# Patient Record
Sex: FEMALE | NOT HISPANIC OR LATINO | Employment: UNEMPLOYED
[De-identification: names, ages, dates, MRNs, and addresses within clinical notes are randomized per-mention and may not be internally consistent; named-entity substitution may affect disease eponyms.]

---

## 2017-03-05 ENCOUNTER — HOSPITAL (OUTPATIENT)
Dept: OTHER | Age: 27
End: 2017-03-05
Attending: EMERGENCY MEDICINE

## 2017-03-05 LAB — ETHANOL SERPL-MCNC: 95 MG/DL

## 2018-08-08 ENCOUNTER — OCC HEALTH (OUTPATIENT)
Dept: OCCUPATIONAL MEDICINE | Age: 28
End: 2018-08-08
Attending: PHYSICIAN ASSISTANT

## 2018-08-15 ENCOUNTER — OFFICE VISIT (OUTPATIENT)
Dept: OCCUPATIONAL MEDICINE | Age: 28
End: 2018-08-15
Attending: PHYSICIAN ASSISTANT

## 2021-01-17 ENCOUNTER — HOSPITAL ENCOUNTER (EMERGENCY)
Facility: HOSPITAL | Age: 31
Discharge: HOME | End: 2021-01-17
Attending: STUDENT IN AN ORGANIZED HEALTH CARE EDUCATION/TRAINING PROGRAM
Payer: COMMERCIAL

## 2021-01-17 VITALS
HEART RATE: 66 BPM | BODY MASS INDEX: 25.61 KG/M2 | HEIGHT: 64 IN | SYSTOLIC BLOOD PRESSURE: 119 MMHG | DIASTOLIC BLOOD PRESSURE: 95 MMHG | WEIGHT: 150 LBS | TEMPERATURE: 98 F | OXYGEN SATURATION: 97 % | RESPIRATION RATE: 18 BRPM

## 2021-01-17 DIAGNOSIS — R50.9 FEVER, UNSPECIFIED FEVER CAUSE: ICD-10-CM

## 2021-01-17 DIAGNOSIS — R21 RASH: Primary | ICD-10-CM

## 2021-01-17 DIAGNOSIS — M25.50 ARTHRALGIA, UNSPECIFIED JOINT: ICD-10-CM

## 2021-01-17 LAB — SARS-COV-2 RNA RESP QL NAA+PROBE: NOT DETECTED

## 2021-01-17 PROCEDURE — 99283 EMERGENCY DEPT VISIT LOW MDM: CPT

## 2021-01-17 RX ORDER — PREDNISONE 20 MG/1
60 TABLET ORAL ONCE
Status: COMPLETED | OUTPATIENT
Start: 2021-01-17 | End: 2021-01-17

## 2021-01-17 RX ORDER — SERTRALINE HYDROCHLORIDE 25 MG/1
25 TABLET, FILM COATED ORAL DAILY
COMMUNITY
End: 2021-07-08

## 2021-01-17 RX ORDER — HYDROXYZINE HYDROCHLORIDE 25 MG/1
25 TABLET, FILM COATED ORAL 3 TIMES DAILY
COMMUNITY
End: 2021-07-08

## 2021-01-17 RX ORDER — LEVOTHYROXINE SODIUM 0.03 MG/1
25 TABLET ORAL
COMMUNITY
End: 2021-07-08

## 2021-01-17 RX ORDER — FAMOTIDINE 20 MG/1
20 TABLET ORAL 2 TIMES DAILY
Qty: 10 TABLET | Refills: 0 | Status: SHIPPED | OUTPATIENT
Start: 2021-01-17 | End: 2021-01-22

## 2021-01-17 RX ORDER — PREDNISONE 20 MG/1
60 TABLET ORAL DAILY
Qty: 15 TABLET | Refills: 0 | Status: SHIPPED | OUTPATIENT
Start: 2021-01-17 | End: 2021-01-22

## 2021-01-17 NOTE — ED PROVIDER NOTES
Patient Seen in: [facility] Emergency Department      History   Patient presents with:  Rash Skin Problem    Stated Complaint: pt states she has a rash all over her body for 2 days    HPI/Subjective:   HPI    Patient is a 24-year-old female who pres pulses. Exam reveals no gallop and no friction rub. No murmur heard. Pulmonary/Chest: Effort normal. No respiratory distress. No stridor, no wheezes. no rales. no tenderness. Abdominal: Soft. Bowel sounds are normal, no distension and no mass.  Ther [id number] [facility]  Schedule an appointment as soon as possible for a visit            Medications Prescribed:  Current Discharge Medication List    START taking these medications    predniSONE 20 MG Oral Tab  Take 3 tablets (6

## 2021-01-17 NOTE — ED INITIAL ASSESSMENT (HPI)
Pt c/o hives to arms and legs for 2 days, \"and my joints hurt. \" Pt denies LISANDRA. States taking Benadryl with no relief.  Pt also taking Hydroxyzine

## 2021-07-07 ENCOUNTER — HOSPITAL ENCOUNTER (EMERGENCY)
Facility: HOSPITAL | Age: 31
Discharge: NURSING, REHAB OR LONG-TERM CARE | End: 2021-07-08
Attending: EMERGENCY MEDICINE
Payer: COMMERCIAL

## 2021-07-07 DIAGNOSIS — F32.A DEPRESSION, UNSPECIFIED DEPRESSION TYPE: Primary | ICD-10-CM

## 2021-07-07 LAB
ALBUMIN SERPL-MCNC: 4.3 G/DL (ref 3.4–5)
ALBUMIN/GLOB SERPL: 1 {RATIO} (ref 1–2)
ALP LIVER SERPL-CCNC: 66 U/L
ALT SERPL-CCNC: 27 U/L
ANION GAP SERPL CALC-SCNC: 10 MMOL/L (ref 0–18)
APAP SERPL-MCNC: <2 UG/ML (ref 10–30)
AST SERPL-CCNC: 19 U/L (ref 15–37)
BASOPHILS # BLD AUTO: 0.01 X10(3) UL (ref 0–0.2)
BASOPHILS NFR BLD AUTO: 0.1 %
BILIRUB SERPL-MCNC: 0.4 MG/DL (ref 0.1–2)
BUN BLD-MCNC: 6 MG/DL (ref 7–18)
BUN/CREAT SERPL: 7.8 (ref 10–20)
CALCIUM BLD-MCNC: 9.5 MG/DL (ref 8.5–10.1)
CHLORIDE SERPL-SCNC: 103 MMOL/L (ref 98–112)
CO2 SERPL-SCNC: 23 MMOL/L (ref 21–32)
CREAT BLD-MCNC: 0.77 MG/DL
DEPRECATED RDW RBC AUTO: 39.1 FL (ref 35.1–46.3)
EOSINOPHIL # BLD AUTO: 0.01 X10(3) UL (ref 0–0.7)
EOSINOPHIL NFR BLD AUTO: 0.1 %
ERYTHROCYTE [DISTWIDTH] IN BLOOD BY AUTOMATED COUNT: 13.1 % (ref 11–15)
ETHANOL SERPL-MCNC: <3 MG/DL (ref ?–3)
FLUAV + FLUBV RNA SPEC NAA+PROBE: NEGATIVE
FLUAV + FLUBV RNA SPEC NAA+PROBE: NEGATIVE
GLOBULIN PLAS-MCNC: 4.5 G/DL (ref 2.8–4.4)
GLUCOSE BLD-MCNC: 113 MG/DL (ref 70–99)
HCT VFR BLD AUTO: 46.2 %
HGB BLD-MCNC: 15.1 G/DL
IMM GRANULOCYTES # BLD AUTO: 0.02 X10(3) UL (ref 0–1)
IMM GRANULOCYTES NFR BLD: 0.2 %
LYMPHOCYTES # BLD AUTO: 0.8 X10(3) UL (ref 1–4)
LYMPHOCYTES NFR BLD AUTO: 9.5 %
M PROTEIN MFR SERPL ELPH: 8.8 G/DL (ref 6.4–8.2)
MCH RBC QN AUTO: 26.9 PG (ref 26–34)
MCHC RBC AUTO-ENTMCNC: 32.7 G/DL (ref 31–37)
MCV RBC AUTO: 82.4 FL
MONOCYTES # BLD AUTO: 0.42 X10(3) UL (ref 0.1–1)
MONOCYTES NFR BLD AUTO: 5 %
NEUTROPHILS # BLD AUTO: 7.19 X10 (3) UL (ref 1.5–7.7)
NEUTROPHILS # BLD AUTO: 7.19 X10(3) UL (ref 1.5–7.7)
NEUTROPHILS NFR BLD AUTO: 85.1 %
OSMOLALITY SERPL CALC.SUM OF ELEC: 280 MOSM/KG (ref 275–295)
PLATELET # BLD AUTO: 229 10(3)UL (ref 150–450)
POTASSIUM SERPL-SCNC: 3.4 MMOL/L (ref 3.5–5.1)
RBC # BLD AUTO: 5.61 X10(6)UL
RSV RNA SPEC NAA+PROBE: NEGATIVE
SALICYLATES SERPL-MCNC: <1.7 MG/DL (ref 2.8–20)
SARS-COV-2 RNA RESP QL NAA+PROBE: NOT DETECTED
SODIUM SERPL-SCNC: 136 MMOL/L (ref 136–145)
TROPONIN I SERPL-MCNC: <0.045 NG/ML (ref ?–0.04)
WBC # BLD AUTO: 8.5 X10(3) UL (ref 4–11)

## 2021-07-07 PROCEDURE — 99285 EMERGENCY DEPT VISIT HI MDM: CPT

## 2021-07-07 PROCEDURE — 93010 ELECTROCARDIOGRAM REPORT: CPT

## 2021-07-07 PROCEDURE — 85025 COMPLETE CBC W/AUTO DIFF WBC: CPT | Performed by: EMERGENCY MEDICINE

## 2021-07-07 PROCEDURE — 80143 DRUG ASSAY ACETAMINOPHEN: CPT | Performed by: EMERGENCY MEDICINE

## 2021-07-07 PROCEDURE — 96374 THER/PROPH/DIAG INJ IV PUSH: CPT

## 2021-07-07 PROCEDURE — 96361 HYDRATE IV INFUSION ADD-ON: CPT

## 2021-07-07 PROCEDURE — 80053 COMPREHEN METABOLIC PANEL: CPT | Performed by: EMERGENCY MEDICINE

## 2021-07-07 PROCEDURE — 84484 ASSAY OF TROPONIN QUANT: CPT | Performed by: EMERGENCY MEDICINE

## 2021-07-07 PROCEDURE — 93005 ELECTROCARDIOGRAM TRACING: CPT

## 2021-07-07 PROCEDURE — 0241U SARS-COV-2/FLU A AND B/RSV BY PCR (GENEXPERT): CPT | Performed by: EMERGENCY MEDICINE

## 2021-07-07 PROCEDURE — 80179 DRUG ASSAY SALICYLATE: CPT | Performed by: EMERGENCY MEDICINE

## 2021-07-07 PROCEDURE — 82077 ASSAY SPEC XCP UR&BREATH IA: CPT | Performed by: EMERGENCY MEDICINE

## 2021-07-07 RX ORDER — POTASSIUM CHLORIDE 20 MEQ/1
40 TABLET, EXTENDED RELEASE ORAL ONCE
Status: COMPLETED | OUTPATIENT
Start: 2021-07-07 | End: 2021-07-07

## 2021-07-07 RX ORDER — LORAZEPAM 2 MG/ML
1 INJECTION INTRAMUSCULAR ONCE
Status: COMPLETED | OUTPATIENT
Start: 2021-07-07 | End: 2021-07-07

## 2021-07-07 NOTE — ED QUICK NOTES
Patients  related that she is having auditory and visual hallucinations since last night and SI since this morning.

## 2021-07-07 NOTE — ED INITIAL ASSESSMENT (HPI)
Patient relates she \"does not want to live anymore\". Patient denies any attempt to harm herself. Denies any ETOH/drug use today. Denies any plan to harm self.

## 2021-07-07 NOTE — BH LEVEL OF CARE ASSESSMENT
Rosanna Collado #[id number] (27year old F) (Adm: 07/07/21)  FADIA [facility]  Innajorge Moreland   [facility] Counselor       Level of Care Assessment      Signed   Date of Service:  7/7/2021 12:00 PM               Signed             Show:Clear all  [x]Manual[x]Template return to [place] on 6/21/21.  Pt was taking Levothroxine and Sertraline.                  Risk to Self or Others                    Suicide Risk Assessments:     Source of information for CSSR: Patient  In what setting is the screener performed?: in person Review of Psychiatric Systems:  Pt reports being \"hypervigilant and overstimulated\". Delusions of paranoia: believes police are out to get her and neighbors are watching her  VH: Pt states that she sees numbers and names and dates.  Pt states she is afr Sadness; Hopeless;Depressed;Stressed  Appropriateness of Affect: Congruent to mood  Range of Affect: Normal  Stability of Affect: Stable  Attitude toward staff: Pleasant  Speech  Rate of Speech: Appropriate  Flow of Speech: Appropriate  Intensity of Volume: Psychiatric Diagnoses     Pervasive Diagnoses     Pertinent Non-Psychiatric Diagnoses  None reported            Mayela WOOD

## 2021-07-07 NOTE — ED QUICK NOTES
Patient becoming visibly more anxious and agitated as she is bouncing around in the stretcher and relating to her family her wish to harm herself. Dr Luisa Desai notified, Ativan IV ordered and given. Will continue to assess.  Patient unable  to remain still to o

## 2021-07-08 VITALS
TEMPERATURE: 99 F | WEIGHT: 160 LBS | BODY MASS INDEX: 25.11 KG/M2 | OXYGEN SATURATION: 96 % | DIASTOLIC BLOOD PRESSURE: 83 MMHG | RESPIRATION RATE: 18 BRPM | HEART RATE: 94 BPM | HEIGHT: 67 IN | SYSTOLIC BLOOD PRESSURE: 132 MMHG

## 2021-07-08 PROBLEM — F29 PSYCHOSIS (HCC): Status: ACTIVE | Noted: 2021-07-08

## 2021-07-08 LAB
AMPHET UR QL SCN: NEGATIVE
ATRIAL RATE: 111 BPM
B-HCG UR QL: NEGATIVE
BENZODIAZ UR QL SCN: NEGATIVE
BILIRUB UR QL STRIP.AUTO: NEGATIVE
CANNABINOIDS UR QL SCN: NEGATIVE
COCAINE UR QL: NEGATIVE
COLOR UR AUTO: YELLOW
CREAT UR-SCNC: 260 MG/DL
GLUCOSE UR STRIP.AUTO-MCNC: NEGATIVE MG/DL
KETONES UR STRIP.AUTO-MCNC: 80 MG/DL
LEUKOCYTE ESTERASE UR QL STRIP.AUTO: NEGATIVE
MDMA UR QL SCN: NEGATIVE
NITRITE UR QL STRIP.AUTO: NEGATIVE
OPIATES UR QL SCN: NEGATIVE
OXYCODONE UR QL SCN: NEGATIVE
P AXIS: 81 DEGREES
P-R INTERVAL: 156 MS
PH UR STRIP.AUTO: 6 [PH] (ref 5–8)
PROT UR STRIP.AUTO-MCNC: 30 MG/DL
Q-T INTERVAL: 322 MS
QRS DURATION: 78 MS
QTC CALCULATION (BEZET): 437 MS
R AXIS: 80 DEGREES
RBC UR QL AUTO: NEGATIVE
SP GR UR STRIP.AUTO: 1.02 (ref 1–1.03)
T AXIS: -59 DEGREES
UROBILINOGEN UR STRIP.AUTO-MCNC: <2 MG/DL
VENTRICULAR RATE: 111 BPM

## 2021-07-08 PROCEDURE — 87086 URINE CULTURE/COLONY COUNT: CPT | Performed by: EMERGENCY MEDICINE

## 2021-07-08 PROCEDURE — 81001 URINALYSIS AUTO W/SCOPE: CPT | Performed by: EMERGENCY MEDICINE

## 2021-07-08 PROCEDURE — 80307 DRUG TEST PRSMV CHEM ANLYZR: CPT | Performed by: EMERGENCY MEDICINE

## 2021-07-08 PROCEDURE — 81025 URINE PREGNANCY TEST: CPT

## 2021-07-08 RX ORDER — SERTRALINE HYDROCHLORIDE 100 MG/1
100 TABLET, FILM COATED ORAL DAILY
Status: ON HOLD | COMMUNITY
Start: 2021-05-06 | End: 2021-07-08

## 2021-07-08 RX ORDER — IBUPROFEN 200 MG
400 TABLET ORAL EVERY 6 HOURS PRN
Status: ON HOLD | COMMUNITY
End: 2021-07-08

## 2021-07-08 RX ORDER — LEVOTHYROXINE SODIUM 0.1 MG/1
100 TABLET ORAL
COMMUNITY
Start: 2021-05-07

## 2021-07-08 RX ORDER — HYDROXYZINE HYDROCHLORIDE 10 MG/1
10 TABLET, FILM COATED ORAL 3 TIMES DAILY
Status: ON HOLD | COMMUNITY
Start: 2021-06-17 | End: 2021-07-08

## 2021-07-08 RX ORDER — DESOGESTREL AND ETHINYL ESTRADIOL 0.15-0.03
1 KIT ORAL DAILY
COMMUNITY
Start: 2021-07-02

## 2021-07-08 NOTE — ED QUICK NOTES
Pt resting comfortably, sleeping, noted appropriate chest rise, RR, depth, rhythm effortless. Continues on 1:1 direct supervision.

## 2021-07-08 NOTE — ED QUICK NOTES
Patient currently sleeping in the stretcher with patients  at bedside. Patient calmed down considerably once her  returned and her mother left the bedside.  Patient has stable VS upon arrival and this RN feels no immediate need to wake her to

## 2021-07-08 NOTE — ED NOTES
This writer received an incoming phone call from Ryan Teresa of [facility].  Patient accepted to [facility].  Accepting doctor is Dr. Araseli Howard. Best contact phone number is [phone].   Pre-cert and nursing needs done befo

## 2021-07-08 NOTE — ED NOTES
[facility]- no acute beds currently  [facility]- no beds currently and state they have no pending discharges for today but \"can call back this afternoon to see if anything changes\"  [facility]- no beds currently

## 2021-07-08 NOTE — ED PROVIDER NOTES
Patient Seen in: [facility] Emergency Department      History   Patient presents with:  Eval-P    Stated Complaint: eval p    HPI/Subjective:   HPI    Patient is a 26-year-old female who not willing to give much history herself.   Patient was seen at nondistended. No rebound, no guarding, no hepatosplenomegaly. EXTREMITIES: Full range of motion, no tenderness, good capillary refill. SKIN: No rash, good turgor. NEURO: Patient moves all extremities.   Patient does not wish to speak with follows comman Xpress SARS-CoV-2/FLU/RSV (real time RT-PCR)  assay on the [employer] [facility], [address] [facility], [facility], [address] [place] [address]. This test is being used under the Food and Drug Administration's Emergency Use Authorization.     The authorized Fact Sheet for Healthcare [employer]

## 2021-07-08 NOTE — ED NOTES
This writer contacted [facility] at 12:13 pm.  Patient deflected no accepting doctor.

## 2021-09-28 ENCOUNTER — HOSPITAL ENCOUNTER (OUTPATIENT)
Dept: GENERAL RADIOLOGY | Facility: HOSPITAL | Age: 31
Discharge: HOME | End: 2021-09-28
Attending: FAMILY MEDICINE
Payer: COMMERCIAL

## 2021-09-28 DIAGNOSIS — S89.90XA KNEE INJURIES: ICD-10-CM

## 2021-09-28 PROCEDURE — 73560 X-RAY EXAM OF KNEE 1 OR 2: CPT | Performed by: FAMILY MEDICINE

## 2021-10-14 ENCOUNTER — HOSPITAL ENCOUNTER (EMERGENCY)
Age: 31
Discharge: HOME | End: 2021-10-14
Attending: EMERGENCY MEDICINE

## 2021-10-14 ENCOUNTER — APPOINTMENT (OUTPATIENT)
Dept: GENERAL RADIOLOGY | Age: 31
End: 2021-10-14
Attending: EMERGENCY MEDICINE

## 2021-10-14 VITALS
DIASTOLIC BLOOD PRESSURE: 51 MMHG | HEIGHT: 67 IN | SYSTOLIC BLOOD PRESSURE: 105 MMHG | TEMPERATURE: 99.3 F | HEART RATE: 97 BPM | WEIGHT: 179.68 LBS | OXYGEN SATURATION: 96 % | BODY MASS INDEX: 28.2 KG/M2 | RESPIRATION RATE: 20 BRPM

## 2021-10-14 DIAGNOSIS — B34.9 VIRAL INFECTION: Primary | ICD-10-CM

## 2021-10-14 LAB
ALBUMIN SERPL-MCNC: 3.8 G/DL (ref 3.6–5.1)
ALP SERPL-CCNC: 66 UNITS/L (ref 45–117)
ALT SERPL-CCNC: 58 UNITS/L
ANION GAP SERPL CALC-SCNC: 9 MMOL/L (ref 10–20)
APPEARANCE UR: CLEAR
AST SERPL-CCNC: 43 UNITS/L
BACTERIA #/AREA URNS HPF: ABNORMAL /HPF
BASOPHILS # BLD: 0 K/MCL (ref 0–0.3)
BASOPHILS NFR BLD: 0 %
BILIRUB CONJ SERPL-MCNC: 0.1 MG/DL (ref 0–0.2)
BILIRUB SERPL-MCNC: 0.2 MG/DL (ref 0.2–1)
BILIRUB UR QL STRIP: NEGATIVE
BUN SERPL-MCNC: 9 MG/DL (ref 6–20)
BUN/CREAT SERPL: 12 (ref 7–25)
CALCIUM SERPL-MCNC: 9.2 MG/DL (ref 8.4–10.2)
CHLORIDE SERPL-SCNC: 104 MMOL/L (ref 98–107)
CO2 SERPL-SCNC: 24 MMOL/L (ref 21–32)
COLOR UR: YELLOW
CREAT SERPL-MCNC: 0.73 MG/DL (ref 0.51–0.95)
DEPRECATED RDW RBC: 38.8 FL (ref 39–50)
EOSINOPHIL # BLD: 0.3 K/MCL (ref 0–0.5)
EOSINOPHIL NFR BLD: 6 %
ERYTHROCYTE [DISTWIDTH] IN BLOOD: 12.9 % (ref 11–15)
FASTING DURATION TIME PATIENT: ABNORMAL H
FLUAV RNA NPH QL NAA+PROBE: NOT DETECTED
FLUBV RNA NPH QL NAA+PROBE: NOT DETECTED
GFR SERPLBLD BASED ON 1.73 SQ M-ARVRAT: >90 ML/MIN
GLUCOSE SERPL-MCNC: 111 MG/DL (ref 70–99)
GLUCOSE UR STRIP-MCNC: NEGATIVE MG/DL
HCG UR QL: NEGATIVE
HCT VFR BLD CALC: 42.9 % (ref 36–46.5)
HGB BLD-MCNC: 14.2 G/DL (ref 12–15.5)
HGB UR QL STRIP: NEGATIVE
HYALINE CASTS #/AREA URNS LPF: ABNORMAL /LPF
IMM GRANULOCYTES # BLD AUTO: 0 K/MCL (ref 0–0.2)
IMM GRANULOCYTES # BLD: 0 %
KETONES UR STRIP-MCNC: NEGATIVE MG/DL
LEUKOCYTE ESTERASE UR QL STRIP: NEGATIVE
LIPASE SERPL-CCNC: 103 UNITS/L (ref 73–393)
LYMPHOCYTES # BLD: 0.3 K/MCL (ref 1–4.8)
LYMPHOCYTES NFR BLD: 7 %
MCH RBC QN AUTO: 27.3 PG (ref 26–34)
MCHC RBC AUTO-ENTMCNC: 33.1 G/DL (ref 32–36.5)
MCV RBC AUTO: 82.5 FL (ref 78–100)
MONOCYTES # BLD: 0.2 K/MCL (ref 0.3–0.9)
MONOCYTES NFR BLD: 4 %
MUCOUS THREADS URNS QL MICRO: PRESENT
NEUTROPHILS # BLD: 4.2 K/MCL (ref 1.8–7.7)
NEUTROPHILS NFR BLD: 83 %
NITRITE UR QL STRIP: NEGATIVE
NRBC BLD MANUAL-RTO: 0 /100 WBC
PH UR STRIP: 6 [PH] (ref 5–7)
PLATELET # BLD AUTO: 153 K/MCL (ref 140–450)
POTASSIUM SERPL-SCNC: 3.4 MMOL/L (ref 3.4–5.1)
PROT SERPL-MCNC: 7.9 G/DL (ref 6.4–8.2)
PROT UR STRIP-MCNC: NEGATIVE MG/DL
RBC # BLD: 5.2 MIL/MCL (ref 4–5.2)
RBC #/AREA URNS HPF: ABNORMAL /HPF
RSV AG NPH QL IA.RAPID: NOT DETECTED
SARS-COV-2 RNA RESP QL NAA+PROBE: NOT DETECTED
SERVICE CMNT-IMP: NORMAL
SERVICE CMNT-IMP: NORMAL
SODIUM SERPL-SCNC: 134 MMOL/L (ref 135–145)
SP GR UR STRIP: 1.02 (ref 1–1.03)
SQUAMOUS #/AREA URNS HPF: ABNORMAL /HPF
UROBILINOGEN UR STRIP-MCNC: 0.2 MG/DL
WBC # BLD: 5.1 K/MCL (ref 4.2–11)
WBC #/AREA URNS HPF: ABNORMAL /HPF

## 2021-10-14 PROCEDURE — 80076 HEPATIC FUNCTION PANEL: CPT | Performed by: EMERGENCY MEDICINE

## 2021-10-14 PROCEDURE — 71045 X-RAY EXAM CHEST 1 VIEW: CPT

## 2021-10-14 PROCEDURE — 83690 ASSAY OF LIPASE: CPT | Performed by: EMERGENCY MEDICINE

## 2021-10-14 PROCEDURE — 85025 COMPLETE CBC W/AUTO DIFF WBC: CPT | Performed by: EMERGENCY MEDICINE

## 2021-10-14 PROCEDURE — 84703 CHORIONIC GONADOTROPIN ASSAY: CPT

## 2021-10-14 PROCEDURE — 0241U COVID/FLU/RSV PANEL: CPT | Performed by: EMERGENCY MEDICINE

## 2021-10-14 PROCEDURE — 81001 URINALYSIS AUTO W/SCOPE: CPT | Performed by: EMERGENCY MEDICINE

## 2021-10-14 PROCEDURE — 80048 BASIC METABOLIC PNL TOTAL CA: CPT | Performed by: EMERGENCY MEDICINE

## 2021-10-14 PROCEDURE — C9803 HOPD COVID-19 SPEC COLLECT: HCPCS

## 2021-10-14 PROCEDURE — 99283 EMERGENCY DEPT VISIT LOW MDM: CPT

## 2021-10-14 PROCEDURE — 96374 THER/PROPH/DIAG INJ IV PUSH: CPT

## 2021-10-14 PROCEDURE — 87040 BLOOD CULTURE FOR BACTERIA: CPT | Performed by: EMERGENCY MEDICINE

## 2021-10-14 PROCEDURE — 96361 HYDRATE IV INFUSION ADD-ON: CPT

## 2021-10-14 PROCEDURE — 96375 TX/PRO/DX INJ NEW DRUG ADDON: CPT

## 2021-10-14 PROCEDURE — 10002800 HB RX 250 W HCPCS: Performed by: EMERGENCY MEDICINE

## 2021-10-14 PROCEDURE — 10002807 HB RX 258: Performed by: EMERGENCY MEDICINE

## 2021-10-14 RX ORDER — LORAZEPAM 2 MG/ML
1 INJECTION INTRAMUSCULAR ONCE
Status: COMPLETED | OUTPATIENT
Start: 2021-10-14 | End: 2021-10-14

## 2021-10-14 RX ADMIN — SODIUM CHLORIDE 1000 ML: 0.9 INJECTION, SOLUTION INTRAVENOUS at 08:18

## 2021-10-14 RX ADMIN — LORAZEPAM 1 MG: 2 INJECTION INTRAMUSCULAR; INTRAVENOUS at 08:19

## 2021-10-14 RX ADMIN — KETOROLAC TROMETHAMINE 15 MG: 15 INJECTION, SOLUTION INTRAMUSCULAR; INTRAVENOUS at 08:21

## 2021-10-14 ASSESSMENT — PAIN SCALES - GENERAL: PAINLEVEL_OUTOF10: 7

## 2021-10-18 LAB
BACTERIA BLD CULT: NORMAL
BACTERIA BLD CULT: NORMAL

## 2025-01-15 ENCOUNTER — HOSPITAL ENCOUNTER (OUTPATIENT)
Dept: GENERAL RADIOLOGY | Age: 35
Discharge: HOME | End: 2025-01-15
Attending: PODIATRIST
Payer: COMMERCIAL

## 2025-01-15 ENCOUNTER — TELEPHONE (OUTPATIENT)
Facility: CLINIC | Age: 35
End: 2025-01-15

## 2025-01-15 ENCOUNTER — OFFICE VISIT (OUTPATIENT)
Dept: ORTHOPEDICS CLINIC | Facility: CLINIC | Age: 35
End: 2025-01-15
Payer: COMMERCIAL

## 2025-01-15 VITALS — HEIGHT: 64 IN | BODY MASS INDEX: 34.31 KG/M2 | WEIGHT: 201 LBS

## 2025-01-15 DIAGNOSIS — M25.572 ACUTE LEFT ANKLE PAIN: Primary | ICD-10-CM

## 2025-01-15 DIAGNOSIS — S82.832A CLOSED FRACTURE OF DISTAL END OF LEFT FIBULA, UNSPECIFIED FRACTURE MORPHOLOGY, INITIAL ENCOUNTER: Primary | ICD-10-CM

## 2025-01-15 DIAGNOSIS — M25.572 ACUTE LEFT ANKLE PAIN: ICD-10-CM

## 2025-01-15 PROCEDURE — 73610 X-RAY EXAM OF ANKLE: CPT | Performed by: PODIATRIST

## 2025-01-15 PROCEDURE — 99204 OFFICE O/P NEW MOD 45 MIN: CPT | Performed by: PODIATRIST

## 2025-01-15 RX ORDER — FOLIC ACID 1 MG/1
TABLET ORAL
COMMUNITY
Start: 2024-04-03 | End: 2025-06-27

## 2025-01-15 RX ORDER — IBUPROFEN 600 MG/1
TABLET, FILM COATED ORAL
COMMUNITY
Start: 2025-01-14

## 2025-01-15 NOTE — PROGRESS NOTES
[facility] New Patient Note    CC:   Chief Complaint   Patient presents with    Ankle Pain     Left Ankle Fracture  Onset: 1/14/2025, slipped on ice  Pain Score: 8       HPI: The patient is a 35 year old female who presents today with complaints of a left ankle injury about 2 days ago on ice.  She had a run in with some steps on the sidewalk  All weight  on left side of ankle - inversion injury  No prior ankle sprains or injury    There is suspicion for ankle fracture  She is here in tennis shoe    Past Medical History:    Anxiety    Depression    Hypothyroidism     History reviewed. No pertinent surgical history.  Current Outpatient Medications   Medication Sig Dispense Refill    folic acid 1 MG Oral Tab take 4 tablet (4 mg) by oral route once daily      ibuprofen 600 MG Oral Tab       traZODone HCl 50 MG Oral Tab Take 1 tablet (50 mg total) by mouth nightly. 30 tablet 0    Levothyroxine Sodium 100 MCG Oral Tab Take 1 tablet (100 mcg total) by mouth before breakfast.      haloperidol 5 MG Oral Tab Take 1 tablet (5 mg total) by mouth 2 (two) times daily. (Patient not taking: Reported on 1/15/2025) 60 tablet 0    LORazepam 0.5 MG Oral Tab Take 1 tablet (0.5 mg total) by mouth 2 (two) times daily. (Patient not taking: Reported on 1/15/2025) 60 tablet 0    APRI 0.15-30 MG-MCG Oral Tab Take 1 tablet by mouth daily. (Patient not taking: Reported on 1/15/2025)       Allergies[1]  History reviewed. No pertinent family history.  Social History     Occupational History    Not on file   Tobacco Use    Smoking status: Never    Smokeless tobacco: Never   Vaping Use    Vaping status: Never Used   Substance and Sexual Activity    Alcohol use: Never    Drug use: Never    Sexual activity: Not on file        ROS:  Complete ROS reviewed by me and non-contributory to the chief complaint except as mentioned above.    Physical Exam:    Ht 5' 4\" (1.626 m)   Wt 201 lb (91.2 kg)   BMI 34.50 kg/m²   Left lower extremity no  calf pain.    No medial ankle pain  About the lateral malleolus and distal fibula.  No deformity.  Full range of motion of the ankle joint with moderate swelling and ecchymosis.    Mild 5th metatarsal pain    Sensation is intact sharp versus dull.  She can feel light touch to the tips of the toes  Palpable pedal pulses.  Hair growth is present.  Skin is supple and feet are well perfused and warm.    Strength testing deferred    Full range of motion and nonpainful motion of the ankle joint, subtalar joint, MP joints, and midfoot joints.     Imaging: fracture distal fibula 3 views left ankle   mild rotation of distal fragment.  Otherwise non displaced and no change from xrays reviewed on patients phone from facility yesterday   personally viewed, independently interpreted and radiology report read      Assessment/Diagnoses:  Diagnoses and all orders for this visit:    Closed fracture of distal end of left fibula, unspecified fracture morphology, initial encounter  -     DME - EXTERNAL         Plan:  I reviewed imaging and exam findings with the patient.  We looked at xrays together and she does have an ankle fracture        The type of fracture is discussed with the patient including anatomy, etiology, and location.  This can be treated conservatively with immobilization in a low profile boot.  Weight bearing status discussed -non weight bearing - use of knee scooter and/or crutch uses  Time to healing up to 8-10 weeks is anticipated.  Discussed delayed healing and potential for non union, arthritis, and continued pain.        Dme High profile boot    RICE   Baby ASA  Limit nsaids  ROM but limit rotational movement    PT when fracture has healed    Xray of ankle in 1 month    If foot  at 5th ray will get foot xrays then as well.              Tiffanie Meza, DPMPodiatric Surgery  FACFAS  [facility]  [address], [address], [place] [address]   [address] [place]  [address]  [email or web address]  [email or web address]  t: [phone]   f: [phone]              This document was partially prepared using [employer] voice recognition software.            [1]   Allergies  Allergen Reactions    Dander HIVES

## 2025-01-15 NOTE — TELEPHONE ENCOUNTER
Patient coming in today for left ankle fracture. [facility].   Patient knows to arrive 15 min early for xray   Future Appointments   Date Time Provider Department Center   1/15/2025  1:00 PM Tiffanie Meza, [facility]

## 2025-02-26 ENCOUNTER — TELEPHONE (OUTPATIENT)
Dept: ORTHOPEDICS CLINIC | Facility: CLINIC | Age: 35
End: 2025-02-26

## 2025-02-26 DIAGNOSIS — S82.832A CLOSED FRACTURE OF DISTAL END OF LEFT FIBULA, UNSPECIFIED FRACTURE MORPHOLOGY, INITIAL ENCOUNTER: Primary | ICD-10-CM

## 2025-02-27 ENCOUNTER — HOSPITAL ENCOUNTER (OUTPATIENT)
Dept: GENERAL RADIOLOGY | Age: 35
Discharge: HOME | End: 2025-02-27
Attending: PODIATRIST
Payer: COMMERCIAL

## 2025-02-27 ENCOUNTER — OFFICE VISIT (OUTPATIENT)
Dept: ORTHOPEDICS CLINIC | Facility: CLINIC | Age: 35
End: 2025-02-27
Payer: COMMERCIAL

## 2025-02-27 VITALS — BODY MASS INDEX: 34.31 KG/M2 | WEIGHT: 201 LBS | HEIGHT: 64 IN

## 2025-02-27 DIAGNOSIS — S82.832D CLOSED FRACTURE OF DISTAL END OF LEFT FIBULA WITH ROUTINE HEALING, UNSPECIFIED FRACTURE MORPHOLOGY, SUBSEQUENT ENCOUNTER: Primary | ICD-10-CM

## 2025-02-27 DIAGNOSIS — S82.832A CLOSED FRACTURE OF DISTAL END OF LEFT FIBULA, UNSPECIFIED FRACTURE MORPHOLOGY, INITIAL ENCOUNTER: ICD-10-CM

## 2025-02-27 PROCEDURE — 99213 OFFICE O/P EST LOW 20 MIN: CPT | Performed by: PODIATRIST

## 2025-02-27 PROCEDURE — 73610 X-RAY EXAM OF ANKLE: CPT | Performed by: PODIATRIST

## 2025-02-27 NOTE — PROGRESS NOTES
[facility] Progress Note    Subjective:     Patient is here for follow-up of her left ankle fracture.  She has been in the boot now for almost 6-week she is doing pretty well.  She does relate to taking the boot off a little bit and walking around        Objective:     Exam left ankle very minimal tenderness but she is tender at the fracture site distal left ankle along the fibula.  No deformity very little swelling full range of motion          Imaging: X-rays show progressive healing of the fracture.  It is still evident.  It is stable        Assessment/Plan:     Diagnoses and all orders for this visit:    Closed fracture of distal end of left fibula with routine healing, unspecified fracture morphology, subsequent encounter  -     DME - EXTERNAL   -     Physical Therapy Referral - [facility]        She is doing well and the fracture is healing.  She needs to stay in the boot for 2 more weeks and then she can transition into the ankle brace and good solid tennis shoe that we fit her for today    Follow-up for final x-ray in 1 month.  Likely will go back to work at that time  Start PT in about 2 weeks for ankle rehab        Tiffanie Meza, DPodiatric Surgery  FACFAS  [facility]  [address] [place] [address], [address] [place] [address] [id number] [address] [place], [place] [id number] [email or web address]  [email or web address]  t: [phone]   f: [phone]            Dragon speech recognition software was used to prepare this note. If a word or phrase is confusing, it is likely do to a failure of recognition. Please contact me with any questions or clarifications.

## 2025-03-11 ENCOUNTER — TELEPHONE (OUTPATIENT)
Dept: PHYSICAL THERAPY | Facility: HOSPITAL | Age: 35
End: 2025-03-11

## 2025-03-12 ENCOUNTER — OFFICE VISIT (OUTPATIENT)
Dept: PHYSICAL THERAPY | Age: 35
End: 2025-03-12
Attending: PODIATRIST
Payer: COMMERCIAL

## 2025-03-12 DIAGNOSIS — S82.832D CLOSED FRACTURE OF DISTAL END OF LEFT FIBULA WITH ROUTINE HEALING, UNSPECIFIED FRACTURE MORPHOLOGY, SUBSEQUENT ENCOUNTER: Primary | ICD-10-CM

## 2025-03-12 PROCEDURE — 97161 PT EVAL LOW COMPLEX 20 MIN: CPT

## 2025-03-12 PROCEDURE — 97140 MANUAL THERAPY 1/> REGIONS: CPT

## 2025-03-12 PROCEDURE — 97110 THERAPEUTIC EXERCISES: CPT

## 2025-03-12 NOTE — PROGRESS NOTES
LOWER EXTREMITY EVALUATION:     Diagnosis:   Closed fracture of distal end of left fibula with routine healing, unspecified fracture morphology, subsequent encounter (W33.753M) Patient:  Ashlee Clement (35 year old, female)        Referring Provider: Tiffanie Meza  Today's Date   3/12/2025    Precautions:  None   Date of Evaluation: 03/12/25  Next MD visit: beginning of April  Date of Surgery: N/A     PATIENT SUMMARY   Summary of chief complaints: L ankle pain after fracture of fibula  History of current condition: Patient with history of fibular fracture 8 weeks ago after slipping on the ice. She has been in a boot for the past 8 weeks and now has transitioned to an ankle brace.   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  Significant findings include: none  Imaging/Tests: XRAY - signs of routine healing   Ashlee  has a past medical history of Anxiety, Depression, and Hypothyroidism.  She  has no past surgical history on file.    ASSESSMENT  Ashlee presents to physical therapy evaluation with primary c/o L ankle pain after fracture of fibula. The results of the objective tests and measures show decreased L ankle A/PROM, joint mobility deficits, strength deficits of ankle extrinsic muscles, gait deviations, balance impairments. Functional deficits include but are not limited to lifting heavy objects, longer walks, work, cleaning/household tasks. Signs and symptoms are consistent with diagnosis of Closed fracture of distal end of left fibula with routine healing, unspecified fracture  morphology, subsequent encounter (X71.373A). Pt and PT discussed evaluation findings, pathology, POC and HEP.  Pt voiced understanding and performs HEP correctly without reported pain. Skilled Physical Therapy is medically necessary to address the above impairments and reach functional goals.    OBJECTIVE:    Musculoskeletal  Observation: B mild pes planus  Palpation: L distal fibula, ATFL/CTFL TTP   Accessory Motion: R talocrural joint AP hypomobile with mild pain     Edema: Figure 8 R 50.5 cm; L 51 cm     ROM and Strength: (* denotes performed with pain)  Knee   ROM MMT (-/5)    R L R L     Flex     4+/5 4/5     Ext (L3)     5/5 4+/5     Ankle/Foot   ROM MMT (-/5)    R L R L     PF 67 45 4+/5 4/5     DF (L4) 2 0 5/5 5/5* L pain     Inversion 32 32 5/5 4/5* L pain     Eversion 14 14 4+/5 4/5     Grt Toe Ext (L5) 50 42 5/5 5/5     Flexibility:  LE Flexibility R L     Hip Flexor         Hamstrings         ITB         Piriformis         Quads         Gastroc-soleus WNL mod restricted     Neurological:  Sensation: L diminished lateral malleolus     Balance and Functional Mobility:  Gait: pt ambulates on level ground with decreased step length; other (use comment) (decreased push off during mid to terminal stance (L))   Balance: SLS: R 15 sec,  L 1 sec     Today's Treatment and Response:   Pt education was provided on exam findings, treatment diagnosis, treatment plan, expectations, and prognosis.  Today's Treatment       3/12/2025   LE Treatment   Therapeutic Exercise HEP Education   Long sitting gastroc stretch   Standing gastroc stretch  Seated heel slides  Seated heel raises  Toe yoga   Manual Therapy L ankle PROM & stretching  L talocrural joint mobilization grade II/III  L midfoot mobilization grade III    Therapeutic Exercise Minutes 10   Manual Therapy Minutes 10   Evaluation Minutes 30   Total Time Of Timed Procedures 20   Total Time Of Service-Based Procedures 30   Total Treatment Time 50   HEP Access Code:  [email or web address]  URL: [email or web address]  Date: 03/12/2025  Prepared by: Rehab Students    Exercises  - Long Sitting Calf Stretch with Strap  - 1 x daily - 7 x weekly - 10 reps - 10 hold  - Gastroc Stretch on Wall  - 1 x daily - 7 x weekly - 3 reps - 30 hold  - Seated Heel Raise  - 1 x daily - 7 x weekly - 3 sets - 10 reps  - Toe Yoga - Alternating Great Toe and Lesser Toe Extension  - 1 x daily - 7 x weekly - 2 sets - 10 reps  - Seated Heel Slide  - 1 x daily - 7 x weekly - 2 sets - 10 reps        Patient was instructed in and issued a HEP for: Access Code: [email or web address]  URL: [email or web address]  Date: 03/12/2025  Prepared by: Rehab Students    Exercises  - Long Sitting Calf Stretch with Strap  - 1 x daily - 7 x weekly - 10 reps - 10 hold  - Gastroc Stretch on Wall  - 1 x daily - 7 x weekly - 3 reps - 30 hold  - Seated Heel Raise  - 1 x daily - 7 x weekly - 3 sets - 10 reps  - Toe Yoga - Alternating Great Toe and Lesser Toe Extension  - 1 x daily - 7 x weekly - 2 sets - 10 reps  - Seated Heel Slide  - 1 x daily - 7 x weekly - 2 sets - 10 reps    Charges:  PT EVAL: Low Complexity,    In agreement with evaluation findings and clinical rationale, this evaluation involved LOW COMPLEXITY decision making due to no personal factors/comorbidities, 1-2 body structures involved/activity limitations, and stable symptoms as documented in the evaluation.                                                                                                              PLAN OF CARE:    Goals: (to be met in 12 visits)   Pt will demonstrate improved DF AROM to >= 10 degrees to promote proper foot clearance during gait and greater ease descending stairs without compensation  Pt will have increased ankle strength to 5/5 throughout for improved ankle control with ADLs such as prolonged gait and stair negotiation  Pt will have improved SLS to >30s for increased ankle stability with ambulation on uneven  surfaces such as gravel and grass   Pt will report <0/10 pain with work and home activities such as lifting 50 lbs    Pt will be independent and compliant with comprehensive HEP to maintain progress achieved in PT     Frequency / Duration: Patient will be seen 1-2x/week or a total of 12  visits over a 90 day period. Treatment will include: Gait training; Manual Therapy; Therapeutic Activities; Therapeutic Exercise; Mechanical Traction; Neuromuscular Re-education; Self-Care Home Management; Home Exercise Program instruction    Education or treatment limitation: None   Rehab Potential: excellent     LEFS Score  No data recorded    Patient/Family/Caregiver was advised of these findings, precautions, and treatment options and has agreed to actively participate in planning and for this course of care.    Thank you for your referral. Please co-sign or sign and return this letter via fax as soon as possible to [phone]. If you have any questions, please contact me at Dept: [phone]    Sincerely,  Electronically signed by therapist: Alvina Vickers, PT, DPT, OCS  Physician's certification required: Yes  I certify the need for these services furnished under this plan of treatment and while under my care.    X___________________________________________________ Date____________________    Certification From: 3/12/2025  To:6/10/2025

## 2025-03-17 ENCOUNTER — OFFICE VISIT (OUTPATIENT)
Dept: PHYSICAL THERAPY | Age: 35
End: 2025-03-17
Attending: PODIATRIST
Payer: COMMERCIAL

## 2025-03-17 PROCEDURE — 97110 THERAPEUTIC EXERCISES: CPT | Performed by: PHYSICAL THERAPIST

## 2025-03-17 NOTE — PROGRESS NOTES
Patient: Ashlee Clement (35 year old, female) Referring Provider:  Insurance:   Diagnosis: Closed fracture of distal end of left fibula with routine healing, unspecified fracture morphology, subsequent encounter (K61.197R) No ref. provider found  1-800-DOCTORS   Date of Surgery: N/A Next MD visit:  N/A   Precautions:  None beginning of April Referral Information:    Date of Evaluation: Req: 0, Auth: 0, Exp:     03/12/25 POC Auth Visits:  12       Today's Date   3/17/2025    Subjective  Patient states she is starting to take regular short walks at home with good tolerance. Performing HEP as instructed. Continue to ascend/descend stairs single step at a time.       Pain: 5/10     Objective  Ambulating with decreased step length, decreased push off L              Assessment  Patient demonstrates good tolerance to ROM and strengthening activities without increased ankle symptoms. Ankle DF limiting primarily by gastroc tightness. Initaited CKC ex's today including leg press and step ups with good initial response. Mild gait changes persists with decreased pace and step lengths, improved with VCs.    Goals (to be met in 12 visits)   Goals: (to be met in 12 visits)   Pt will demonstrate improved DF AROM to >= 10 degrees to promote proper foot clearance during gait and greater ease descending stairs without compensation  Pt will have increased ankle strength to 5/5 throughout for improved ankle control with ADLs such as prolonged gait and stair negotiation  Pt will have improved SLS to >30s for increased ankle stability with ambulation on uneven surfaces such as gravel and grass   Pt will report <0/10 pain with work and home activities such as lifting 50 lbs    Pt will be independent and compliant with comprehensive HEP to maintain progress achieved in PT     Plan  Continue to progress with ROM, strengthening, and    Treatment Last 4 Visits       3/17/2025   PT Treatment   Treatment Day 2          3/12/2025  3/17/2025   LE Treatment   Treatment Day  2   Therapeutic Exercise HEP Education   Long sitting gastroc stretch   Standing gastroc stretch  Seated heel slides  Seated heel raises  Toe yoga Recumbent bike 5'  PROM L ankle and toes  Ankle alphabets x2  Ankle circles x20 CW/CCW  4 way ankle t-band RTB x20 ea  Standing calf stretch off 4\" step 2x30\"  Step up 4\" step 2x10  Standing rocker board AP taps and balance, 60\" ea  Shuttle leg press 87# 3x10   Manual Therapy L ankle PROM & stretching  L talocrural joint mobilization grade II/III  L midfoot mobilization grade III     Therapeutic Exercise Minutes 10 45   Manual Therapy Minutes 10    Evaluation Minutes 30    Total Time Of Timed Procedures 20 45   Total Time Of Service-Based Procedures 30 0   Total Treatment Time 50 45   HEP Access Code: [email or web address]  URL: [email or web address]  Date: 03/12/2025  Prepared by: Rehab Students    Exercises  - Long Sitting Calf Stretch with Strap  - 1 x daily - 7 x weekly - 10 reps - 10 hold  - Gastroc Stretch on Wall  - 1 x daily - 7 x weekly - 3 reps - 30 hold  - Seated Heel Raise  - 1 x daily - 7 x weekly - 3 sets - 10 reps  - Toe Yoga - Alternating Great Toe and Lesser Toe Extension  - 1 x daily - 7 x weekly - 2 sets - 10 reps  - Seated Heel Slide  - 1 x daily - 7 x weekly - 2 sets - 10 reps         HEP  Access Code: [email or web address]  URL: [email or web address]  Date: 03/12/2025  Prepared by: Rehab Students    Exercises  - Long Sitting Calf Stretch with Strap  - 1 x daily - 7 x weekly - 10 reps - 10 hold  - Gastroc Stretch on Wall  - 1 x daily - 7 x weekly - 3 reps - 30 hold  - Seated Heel Raise  - 1 x daily - 7 x weekly - 3 sets - 10 reps  - Toe Yoga - Alternating Great Toe and Lesser Toe Extension  - 1 x daily - 7 x weekly - 2 sets - 10 reps  - Seated Heel Slide  - 1 x daily - 7 x weekly - 2 sets - 10 reps    Charges     3 Ther Ex

## 2025-03-19 ENCOUNTER — OFFICE VISIT (OUTPATIENT)
Dept: PHYSICAL THERAPY | Age: 35
End: 2025-03-19
Attending: PODIATRIST
Payer: COMMERCIAL

## 2025-03-19 PROCEDURE — 97110 THERAPEUTIC EXERCISES: CPT

## 2025-03-19 PROCEDURE — 97140 MANUAL THERAPY 1/> REGIONS: CPT

## 2025-03-20 NOTE — PROGRESS NOTES
Patient: Ashlee Clement (35 year old, female) Referring Provider:  Insurance:   Diagnosis: Closed fracture of distal end of left fibula with routine healing, unspecified fracture morphology, subsequent encounter (W94.971L) No ref. provider found  [employer]   Date of Surgery: N/A Next MD visit:  N/A   Precautions:  None beginning of April Referral Information:    Date of Evaluation: Req: 0, Auth: 0, Exp:     03/12/25 POC Auth Visits:  12       Today's Date   3/19/2025    Subjective  Patient reports that she was feeling good after last session. States that she has minimal pain currently.       Pain: 3/10     Objective  Ambulating with decreased step length, decreased push off L          Assessment  Patient tolerated session well. Able to progress CKC exercises without increase in symptoms. Patient educated on gradual return to walking.    Goals (to be met in 12 visits)      Plan  Continue per plan of care.    Treatment Last 4 Visits       3/17/2025 3/19/2025   PT Treatment   Treatment Day 2 4    3       Multiple values from one day are sorted in reverse-chronological order          3/12/2025 3/17/2025 3/19/2025   LE Treatment   Treatment Day  2 4    3   Therapeutic Exercise HEP Education   Long sitting gastroc stretch   Standing gastroc stretch  Seated heel slides  Seated heel raises  Toe yoga Recumbent bike 5'  PROM L ankle and toes  Ankle alphabets x2  Ankle circles x20 CW/CCW  4 way ankle t-band RTB x20 ea  Standing calf stretch off 4\" step 2x30\"  Step up 4\" step 2x10  Standing rocker board AP taps and balance, 60\" ea  Shuttle leg press 87# 3x10 Recumbent bike 5'  PROM L ankle and toes  Ankle alphabets x2  Ankle circles x20 CW/CCW  4 way ankle t-band RTB x20 ea  Standing calf stretch off 4\" step 2x30\"  Step up 4\" step 2x10  Standing rocker board AP taps and balance, 60\" ea  Shuttle leg press 87# 3x10  Single leg press 25# 3 x 10   HR/TR 2 x 10   Hip abduction x 10 (B)   Hip extension x 10 (B)       Manual Therapy L ankle PROM & stretching  L talocrural joint mobilization grade II/III  L midfoot mobilization grade III   L ankle PROM & stretching  L talocrural joint mobilization grade II/III  L midfoot mobilization grade III        Therapeutic Exercise Minutes 10 45 30   Manual Therapy Minutes 10  15   Evaluation Minutes 30     Total Time Of Timed Procedures 20 45 45   Total Time Of Service-Based Procedures 30 0 0   Total Treatment Time 50 45 45   HEP Access Code: [email or web address]  URL: [email or web address]  Date: 03/12/2025  Prepared by: Rehab Students    Exercises  - Long Sitting Calf Stretch with Strap  - 1 x daily - 7 x weekly - 10 reps - 10 hold  - Gastroc Stretch on Wall  - 1 x daily - 7 x weekly - 3 reps - 30 hold  - Seated Heel Raise  - 1 x daily - 7 x weekly - 3 sets - 10 reps  - Toe Yoga - Alternating Great Toe and Lesser Toe Extension  - 1 x daily - 7 x weekly - 2 sets - 10 reps  - Seated Heel Slide  - 1 x daily - 7 x weekly - 2 sets - 10 reps  Education provided on gradual progression back to walking. Suggested to start with 5-10 minutes and progress by 5 minutes each time if no symptoms occurs.        Multiple values from one day are sorted in reverse-chronological order        HEP  Education provided on gradual progression back to walking. Suggested to start with 5-10 minutes and progress by 5 minutes each time if no symptoms occurs.     Charges     1 manual, 2 TE

## 2025-03-24 ENCOUNTER — OFFICE VISIT (OUTPATIENT)
Dept: PHYSICAL THERAPY | Age: 35
End: 2025-03-24
Attending: PODIATRIST
Payer: COMMERCIAL

## 2025-03-24 PROCEDURE — 97140 MANUAL THERAPY 1/> REGIONS: CPT

## 2025-03-24 PROCEDURE — 97110 THERAPEUTIC EXERCISES: CPT

## 2025-03-24 NOTE — PROGRESS NOTES
Patient: Ashlee Clement (35 year old, female) Referring Provider:  Insurance:   Diagnosis: Closed fracture of distal end of left fibula with routine healing, unspecified fracture morphology, subsequent encounter (Q89.773F) No ref. provider found  [employer]   Date of Surgery: N/A Next MD visit:  N/A   Precautions:  None beginning of April Referral Information:    Date of Evaluation: Req: 0, Auth: 0, Exp:     03/12/25 POC Auth Visits:  12       Today's Date   3/24/2025    Subjective  Patient states that she feels more pain when the weather is cold. States that her ankle feels stiff today.       Pain: 3/10     Objective  Ambulating with decreased step length, decreased push off L         Assessment  Patient tolerated session well without increased symptoms. Patient making good progress toward goals. Patient PROM improving with manual interventions.    Goals (to be met in 12 visits)   Pt will demonstrate improved DF AROM to >= 10 degrees to promote proper foot clearance during gait and greater ease descending stairs without compensation  Pt will have increased ankle strength to 5/5 throughout for improved ankle control with ADLs such as prolonged gait and stair negotiation  Pt will have improved SLS to >30s for increased ankle stability with ambulation on uneven surfaces such as gravel and grass   Pt will report <0/10 pain with work and home activities such as lifting 50 lbs    Pt will be independent and compliant with comprehensive HEP to maintain progress achieved in PT     Plan  Continue per plan of care.    Treatment Last 4 Visits       3/17/2025 3/19/2025 3/24/2025   PT Treatment   Treatment Day 2 4    3 5       Multiple values from one day are sorted in reverse-chronological order          3/12/2025 3/17/2025 3/19/2025 3/24/2025   LE Treatment   Treatment Day  2 4    3 5   Therapeutic Exercise HEP Education   Long sitting gastroc stretch   Standing gastroc stretch  Seated heel slides  Seated  heel raises  Toe yoga Recumbent bike 5'  PROM L ankle and toes  Ankle alphabets x2  Ankle circles x20 CW/CCW  4 way ankle t-band RTB x20 ea  Standing calf stretch off 4\" step 2x30\"  Step up 4\" step 2x10  Standing rocker board AP taps and balance, 60\" ea  Shuttle leg press 87# 3x10 Recumbent bike 5'  PROM L ankle and toes  Ankle alphabets x2  Ankle circles x20 CW/CCW  4 way ankle t-band RTB x20 ea  Standing calf stretch off 4\" step 2x30\"  Step up 4\" step 2x10  Standing rocker board AP taps and balance, 60\" ea  Shuttle leg press 87# 3x10  Single leg press 25# 3 x 10   HR/TR 2 x 10   Hip abduction x 10 (B)   Hip extension x 10 (B)    Recumbent bike 5'   PROM L ankle and toes   4 way ankle t-band RTB x20 ea   Standing calf stretch off 4\" step 2x30\"   Step up 4\" step 2x10   Lateral step up 4'' step 2 x 10   Standing rocker board AP/ML taps and balance, 60\" ea   Shuttle leg press 87# 3x10   Single leg press 50# 3 x 10 (B)  HR/TR 2 x 10   Hip 3 way on foam x 10 (B)   Squats 2 x 10      Manual Therapy L ankle PROM & stretching  L talocrural joint mobilization grade II/III  L midfoot mobilization grade III   L ankle PROM & stretching  L talocrural joint mobilization grade II/III  L midfoot mobilization grade III      L talocrural joint mobilization grade II/III  L midfoot mobilization grade III       Therapeutic Exercise Minutes 10 45 30 30   Manual Therapy Minutes 10  15 15   Evaluation Minutes 30      Total Time Of Timed Procedures 20 45 45 45   Total Time Of Service-Based Procedures 30 0 0 0   Total Treatment Time 50 45 45 45   HEP Access Code: [email or web address]  URL: [email or web address]  Date: 03/12/2025  Prepared by: [employer] Students    Exercises  - Long Sitting Calf Stretch with Strap  - 1 x daily - 7 x weekly - 10 reps - 10 hold  - Gastroc Stretch on Wall  - 1 x daily - 7 x weekly - 3 reps - 30 hold  - Seated Heel Raise  - 1 x daily - 7 x weekly - 3 sets - 10 reps  - Toe Yoga - Alternating Great Toe and  Lesser Toe Extension  - 1 x daily - 7 x weekly - 2 sets - 10 reps  - Seated Heel Slide  - 1 x daily - 7 x weekly - 2 sets - 10 reps  Education provided on gradual progression back to walking. Suggested to start with 5-10 minutes and progress by 5 minutes each time if no symptoms occurs.         Multiple values from one day are sorted in reverse-chronological order        Charges     1 manual, 2 TE

## 2025-03-26 ENCOUNTER — OFFICE VISIT (OUTPATIENT)
Dept: PHYSICAL THERAPY | Age: 35
End: 2025-03-26
Attending: PODIATRIST
Payer: COMMERCIAL

## 2025-03-26 PROCEDURE — 97140 MANUAL THERAPY 1/> REGIONS: CPT

## 2025-03-26 PROCEDURE — 97110 THERAPEUTIC EXERCISES: CPT

## 2025-03-26 NOTE — PROGRESS NOTES
Patient: Ashlee Clement (35 year old, female) Referring Provider:  Insurance:   Diagnosis: Closed fracture of distal end of left fibula with routine healing, unspecified fracture morphology, subsequent encounter (Y37.610M) No ref. provider found  [employer]   Date of Surgery: N/A Next MD visit:  N/A   Precautions:  None beginning of April Referral Information:    Date of Evaluation: Req: 0, Auth: 0, Exp:     03/12/25 POC Auth Visits:  12       Today's Date   3/26/2025    Subjective  Patient states because it is still cold out she continues to have stiffness in her ankle.       Pain: 5/10     Objective  Ambulating with decreased step length, decreased push off L          Assessment  Patient with some mild swelling in her ankle which improved with STM. Patient continues to tolerate CKC progressions well without increased symptoms.    Goals (to be met in 12 visits)   Pt will demonstrate improved DF AROM to >= 10 degrees to promote proper foot clearance during gait and greater ease descending stairs without compensation  Pt will have increased ankle strength to 5/5 throughout for improved ankle control with ADLs such as prolonged gait and stair negotiation  Pt will have improved SLS to >30s for increased ankle stability with ambulation on uneven surfaces such as gravel and grass   Pt will report <0/10 pain with work and home activities such as lifting 50 lbs    Pt will be independent and compliant with comprehensive HEP to maintain progress achieved in PT         Plan  Continue per plan of care.    Treatment Last 4 Visits  Treatment Day: 6       3/17/2025 3/19/2025 3/24/2025 3/26/2025   LE Treatment   Therapeutic Exercise Recumbent bike 5'  PROM L ankle and toes  Ankle alphabets x2  Ankle circles x20 CW/CCW  4 way ankle t-band RTB x20 ea  Standing calf stretch off 4\" step 2x30\"  Step up 4\" step 2x10  Standing rocker board AP taps and balance, 60\" ea  Shuttle leg press 87# 3x10 Recumbent bike 5'  PROM  L ankle and toes  Ankle alphabets x2  Ankle circles x20 CW/CCW  4 way ankle t-band RTB x20 ea  Standing calf stretch off 4\" step 2x30\"  Step up 4\" step 2x10  Standing rocker board AP taps and balance, 60\" ea  Shuttle leg press 87# 3x10  Single leg press 25# 3 x 10   HR/TR 2 x 10   Hip abduction x 10 (B)   Hip extension x 10 (B)    Recumbent bike 5'   PROM L ankle and toes   4 way ankle t-band RTB x20 ea   Standing calf stretch off 4\" step 2x30\"   Step up 4\" step 2x10   Lateral step up 4'' step 2 x 10   Standing rocker board AP/ML taps and balance, 60\" ea   Shuttle leg press 87# 3x10   Single leg press 50# 3 x 10 (B)  HR/TR 2 x 10   Hip 3 way on foam x 10 (B)   Squats 2 x 10    Recumbent bike 5'   PROM L ankle and toes   4 way ankle t-band RTB x20 ea   Standing calf stretch off 4\" step 2x30\"   Step up 4\" step 2x10   Lateral step up 4'' step 2 x 10   Standing rocker board AP/ML taps and balance, 60\" ea   Shuttle leg press 87# 3x10   Single leg press 50# 3 x 10 (B)   HR/TR 2 x 10   Hip 3 way on foam x 10 (B)   Squats 2 x 10      Manual Therapy  L ankle PROM & stretching  L talocrural joint mobilization grade II/III  L midfoot mobilization grade III      L talocrural joint mobilization grade II/III  L midfoot mobilization grade III     STM to posterior ankle complex   L talocrural joint mobilization grade II/III  L midfoot mobilization grade III        Therapeutic Exercise Minutes 45 30 30 30   Manual Therapy Minutes  15 15 15   Total Time Of Timed Procedures 45 45 45 45   Total Time Of Service-Based Procedures 0 0 0 0   Total Treatment Time 45 45 45 45   HEP  Education provided on gradual progression back to walking. Suggested to start with 5-10 minutes and progress by 5 minutes each time if no symptoms occurs.           HEP  Education provided on gradual progression back to walking. Suggested to start with 5-10 minutes and progress by 5 minutes each time if no symptoms occurs.     Charges     1 manual, 2  TE

## 2025-03-31 ENCOUNTER — OFFICE VISIT (OUTPATIENT)
Dept: PHYSICAL THERAPY | Age: 35
End: 2025-03-31
Attending: PODIATRIST
Payer: COMMERCIAL

## 2025-03-31 PROCEDURE — 97140 MANUAL THERAPY 1/> REGIONS: CPT

## 2025-03-31 PROCEDURE — 97016 VASOPNEUMATIC DEVICE THERAPY: CPT

## 2025-03-31 PROCEDURE — 97110 THERAPEUTIC EXERCISES: CPT

## 2025-03-31 NOTE — PROGRESS NOTES
Patient: Ashlee Clement (35 year old, female) Referring Provider:  Insurance:   Diagnosis: Closed fracture of distal end of left fibula with routine healing, unspecified fracture morphology, subsequent encounter (G88.875V) No ref. provider found  [employer]   Date of Surgery: N/A Next MD visit:  N/A   Precautions:  None beginning of April Referral Information:    Date of Evaluation: Req: 0, Auth: 0, Exp:     03/12/25 POC Auth Visits:  12       Today's Date   3/31/2025    Subjective  Patient reports that she has more pain with PT but isnt sure if it's because she is doing more activity.       Pain: 4/10     Objective  Ambulating with decreased step length, decreased push off L         Assessment  Treatment modified at today's session to include non weight bearing strengthening and ROM due to ongoing pain and swelling. Patient tolerated session well without increased symptoms.    Goals (to be met in 12 visits)   Pt will demonstrate improved DF AROM to >= 10 degrees to promote proper foot clearance during gait and greater ease descending stairs without compensation  Pt will have increased ankle strength to 5/5 throughout for improved ankle control with ADLs such as prolonged gait and stair negotiation  Pt will have improved SLS to >30s for increased ankle stability with ambulation on uneven surfaces such as gravel and grass   Pt will report <0/10 pain with work and home activities such as lifting 50 lbs    Pt will be independent and compliant with comprehensive HEP to maintain progress achieved in PT             Plan  Continue per plan of care. Progress pending tolerance to today's session.    Treatment Last 4 Visits  Treatment Day: 7       3/19/2025 3/24/2025 3/26/2025 3/31/2025   LE Treatment   Therapeutic Exercise Recumbent bike 5'  PROM L ankle and toes  Ankle alphabets x2  Ankle circles x20 CW/CCW  4 way ankle t-band RTB x20 ea  Standing calf stretch off 4\" step 2x30\"  Step up 4\" step  2x10  Standing rocker board AP taps and balance, 60\" ea  Shuttle leg press 87# 3x10  Single leg press 25# 3 x 10   HR/TR 2 x 10   Hip abduction x 10 (B)   Hip extension x 10 (B)    Recumbent bike 5'   PROM L ankle and toes   4 way ankle t-band RTB x20 ea   Standing calf stretch off 4\" step 2x30\"   Step up 4\" step 2x10   Lateral step up 4'' step 2 x 10   Standing rocker board AP/ML taps and balance, 60\" ea   Shuttle leg press 87# 3x10   Single leg press 50# 3 x 10 (B)  HR/TR 2 x 10   Hip 3 way on foam x 10 (B)   Squats 2 x 10    Recumbent bike 5'   PROM L ankle and toes   4 way ankle t-band RTB x20 ea   Standing calf stretch off 4\" step 2x30\"   Step up 4\" step 2x10   Lateral step up 4'' step 2 x 10   Standing rocker board AP/ML taps and balance, 60\" ea   Shuttle leg press 87# 3x10   Single leg press 50# 3 x 10 (B)   HR/TR 2 x 10   Hip 3 way on foam x 10 (B)   Squats 2 x 10    Recumbent bike 5'   PROM L ankle and toes   4 way ankle t-band RTB x20 ea   Towel wipers ER/IR 2 x 10   Seated calf raise 2 x 10 5#   Seated DF ROM 2 x 10   Ankle circles 2 x10        Manual Therapy L ankle PROM & stretching  L talocrural joint mobilization grade II/III  L midfoot mobilization grade III      L talocrural joint mobilization grade II/III  L midfoot mobilization grade III     STM to posterior ankle complex   L talocrural joint mobilization grade II/III  L midfoot mobilization grade III      STM to posterior ankle complex   L talocrural joint mobilization grade II/III   L midfoot mobilization grade III      Modalities    Game ready x 10 minutes medium compression   Therapeutic Exercise Minutes 30 30 30 15   Manual Therapy Minutes 15 15 15 25   Hot/Cold Pack Minutes    10   Total Time Of Timed Procedures 45 45 45 40   Total Time Of Service-Based Procedures 0 0 0 10   Total Treatment Time 45 45 45 50   HEP Education provided on gradual progression back to walking. Suggested to start with 5-10 minutes and progress by 5 minutes each  time if no symptoms occurs.            HEP  Education provided on gradual progression back to walking. Suggested to start with 5-10 minutes and progress by 5 minutes each time if no symptoms occurs.     Charges     2 manual, 1 TE, 1 Vaso

## 2025-04-09 ENCOUNTER — OFFICE VISIT (OUTPATIENT)
Dept: PHYSICAL THERAPY | Age: 35
End: 2025-04-09
Attending: PODIATRIST
Payer: COMMERCIAL

## 2025-04-09 PROCEDURE — 97110 THERAPEUTIC EXERCISES: CPT | Performed by: PHYSICAL THERAPIST

## 2025-04-09 NOTE — PROGRESS NOTES
Patient: Ashlee Clement (35 year old, female) Referring Provider:  Insurance:   Diagnosis: Closed fracture of distal end of left fibula with routine healing, unspecified fracture morphology, subsequent encounter (P32.981W) No ref. provider found  [employer]   Date of Surgery: N/A Next MD visit:  N/A   Precautions:  None beginning of April Referral Information:    Date of Evaluation: Req: 0, Auth: 0, Exp:     03/12/25 POC Auth Visits:  12       Today's Date   4/9/2025    Subjective  Patient states she continues to have occasional increased pain over the last L ankle with prolonged standing or walking.       Pain: 4/10     Objective  L ankle AROM WFL.              Assessment  Patient demonstrates improving gait mechanics. Demonstrates improved tolerance to CKC ex's and balance activities without increasing lateral ankle pain including lateral stepping and step ups to 6\" step.    Goals (to be met in 12 visits)   Goals: (to be met in 12 visits)   Pt will demonstrate improved DF AROM to >= 10 degrees to promote proper foot clearance during gait and greater ease descending stairs without compensation  Pt will have increased ankle strength to 5/5 throughout for improved ankle control with ADLs such as prolonged gait and stair negotiation  Pt will have improved SLS to >30s for increased ankle stability with ambulation on uneven surfaces such as gravel and grass   Pt will report <0/10 pain with work and home activities such as lifting 50 lbs    Pt will be independent and compliant with comprehensive HEP to maintain progress achieved in PT         Plan  Continue to progress with strengthening and balance activities as tolerated.    Treatment Last 4 Visits  Treatment Day: 8       3/24/2025 3/26/2025 3/31/2025 4/9/2025   LE Treatment   Therapeutic Exercise Recumbent bike 5'   PROM L ankle and toes   4 way ankle t-band RTB x20 ea   Standing calf stretch off 4\" step 2x30\"   Step up 4\" step 2x10   Lateral step  up 4'' step 2 x 10   Standing rocker board AP/ML taps and balance, 60\" ea   Shuttle leg press 87# 3x10   Single leg press 50# 3 x 10 (B)  HR/TR 2 x 10   Hip 3 way on foam x 10 (B)   Squats 2 x 10    Recumbent bike 5'   PROM L ankle and toes   4 way ankle t-band RTB x20 ea   Standing calf stretch off 4\" step 2x30\"   Step up 4\" step 2x10   Lateral step up 4'' step 2 x 10   Standing rocker board AP/ML taps and balance, 60\" ea   Shuttle leg press 87# 3x10   Single leg press 50# 3 x 10 (B)   HR/TR 2 x 10   Hip 3 way on foam x 10 (B)   Squats 2 x 10    Recumbent bike 5'   PROM L ankle and toes   4 way ankle t-band RTB x20 ea   Towel wipers ER/IR 2 x 10   Seated calf raise 2 x 10 5#   Seated DF ROM 2 x 10   Ankle circles 2 x10      Recumbent bike 5'   PROM L ankle and toes   4 way ankle t-band RTB x20 ea   Ankle circles 2 x10   Rockerboard taps AP/ML 1' ea  Side stepping RTB loop on ankles, 2 laps  Shuttle leg press 100# 2x15  Step ups 6\" step 1x10       Neuro Re-Education    SLS balance 2x30\"   Manual Therapy L talocrural joint mobilization grade II/III  L midfoot mobilization grade III     STM to posterior ankle complex   L talocrural joint mobilization grade II/III  L midfoot mobilization grade III      STM to posterior ankle complex   L talocrural joint mobilization grade II/III   L midfoot mobilization grade III    Jt mobs L TC joint Post glide Gr 3   Modalities   Game ready x 10 minutes medium compression    Therapeutic Exercise Minutes 30 30 15 45   Manual Therapy Minutes 15 15 25 3   Hot/Cold Pack Minutes   10    Total Time Of Timed Procedures 45 45 40 48   Total Time Of Service-Based Procedures 0 0 10 0   Total Treatment Time 45 45 50 48        HEP  No updates     Charges     3 Ther Ex

## 2025-04-11 ENCOUNTER — OFFICE VISIT (OUTPATIENT)
Dept: PHYSICAL THERAPY | Age: 35
End: 2025-04-11
Attending: PODIATRIST
Payer: COMMERCIAL

## 2025-04-11 PROCEDURE — 97110 THERAPEUTIC EXERCISES: CPT | Performed by: PHYSICAL THERAPIST

## 2025-04-11 NOTE — PROGRESS NOTES
Patient: Ashlee Clement (35 year old, female) Referring Provider:  Insurance:   Diagnosis: Closed fracture of distal end of left fibula with routine healing, unspecified fracture morphology, subsequent encounter (O84.009A) No ref. provider found  [employer]   Date of Surgery: N/A Next MD visit:  N/A   Precautions:  None beginning of April Referral Information:    Date of Evaluation: Req: 0, Auth: 0, Exp:     03/12/25 POC Auth Visits:  12       Today's Date   4/11/2025    Subjective  Patient states she tolerated last treatment well without increased L ankle symptoms. Patient states she continues to have intermittent pain with prolonged walking.       Pain: 0/10     Objective  Mild balance deficits SLS L              Assessment  Patient tolerated treatment well, progressing with LE strengthening and balance activities without increased ankle symptoms. Improving gait mechanics. Mild balance deficits persist with SL standing L>R.    Goals (to be met in 12 visits)   Goals: (to be met in 12 visits)   Pt will demonstrate improved DF AROM to >= 10 degrees to promote proper foot clearance during gait and greater ease descending stairs without compensation  Pt will have increased ankle strength to 5/5 throughout for improved ankle control with ADLs such as prolonged gait and stair negotiation  Pt will have improved SLS to >30s for increased ankle stability with ambulation on uneven surfaces such as gravel and grass   Pt will report <0/10 pain with work and home activities such as lifting 50 lbs    Pt will be independent and compliant with comprehensive HEP to maintain progress achieved in PT             Plan  Continue to progress with strengthening and balance activities as tolerated.    Treatment Last 4 Visits  Treatment Day: 9       3/26/2025 3/31/2025 4/9/2025 4/11/2025   LE Treatment   Therapeutic Exercise Recumbent bike 5'   PROM L ankle and toes   4 way ankle t-band RTB x20 ea   Standing calf stretch  off 4\" step 2x30\"   Step up 4\" step 2x10   Lateral step up 4'' step 2 x 10   Standing rocker board AP/ML taps and balance, 60\" ea   Shuttle leg press 87# 3x10   Single leg press 50# 3 x 10 (B)   HR/TR 2 x 10   Hip 3 way on foam x 10 (B)   Squats 2 x 10    Recumbent bike 5'   PROM L ankle and toes   4 way ankle t-band RTB x20 ea   Towel wipers ER/IR 2 x 10   Seated calf raise 2 x 10 5#   Seated DF ROM 2 x 10   Ankle circles 2 x10      Recumbent bike 5'   PROM L ankle and toes   4 way ankle t-band RTB x20 ea   Ankle circles 2 x10   Rockerboard taps AP/ML 1' ea  Side stepping RTB loop on ankles, 2 laps  Shuttle leg press 100# 2x15  Step ups 6\" step 1x10     Recumbent bike 5'   PROM L ankle and toes   Standing calf stretch on slant board 2x30\"  Rockerboard taps AP/ML 1' ea  Side stepping RTB loop on ankles, 2 laps  Shuttle leg press 112# 2x15  Shuttle SL leg press 56# x20 B  Step ups with march 6\" step 2x10 B  Standing calf raises 2x10     Neuro Re-Education   SLS balance 2x30\" Rockerboard balance, AP/ML 60\" ea  SLS balance on Airex 3x20\" b   Manual Therapy STM to posterior ankle complex   L talocrural joint mobilization grade II/III  L midfoot mobilization grade III      STM to posterior ankle complex   L talocrural joint mobilization grade II/III   L midfoot mobilization grade III    Jt mobs L TC joint Post glide Gr 3    Modalities  Game ready x 10 minutes medium compression     Therapeutic Exercise Minutes 30 15 45 41   Neuro Re-Educ Minutes    4   Manual Therapy Minutes 15 25 3    Hot/Cold Pack Minutes  10     Total Time Of Timed Procedures 45 40 48 45   Total Time Of Service-Based Procedures 0 10 0 0   Total Treatment Time 45 50 48 45        HEP  Education provided on gradual progression back to walking. Suggested to start with 5-10 minutes and progress by 5 minutes each time if no symptoms occurs.     Charges     3 Ther Ex

## 2025-04-15 ENCOUNTER — TELEPHONE (OUTPATIENT)
Dept: PHYSICAL THERAPY | Age: 35
End: 2025-04-15

## 2025-04-15 ENCOUNTER — OFFICE VISIT (OUTPATIENT)
Dept: PHYSICAL THERAPY | Age: 35
End: 2025-04-15
Attending: PODIATRIST
Payer: COMMERCIAL

## 2025-04-15 PROCEDURE — 97110 THERAPEUTIC EXERCISES: CPT

## 2025-04-15 NOTE — PROGRESS NOTES
Patient: Ashlee Clement (35 year old, female) Referring Provider:  Insurance:   Diagnosis: Closed fracture of distal end of left fibula with routine healing, unspecified fracture morphology, subsequent encounter (N92.096L) No ref. provider found  [place]   Date of Surgery: N/A Next MD visit:  N/A   Precautions:  None beginning of April Referral Information:    Date of Evaluation: Req: 0, Auth: 0, Exp:     03/12/25 POC Auth Visits:  12       Today's Date   4/15/2025    Subjective  Patient reports that she feels off balanced at times, feels unstable with walking.       Pain: 0/10     Objective  Mild balance deficits SLS L         Assessment  Patient tolerated session well without increased symptoms. Patient able to complete increased balance and proproception execises to improve stability with gait. Cues required for proper carryout of exercises. Patient would benefit from continued PT to improve strength and ROM.    Goals (to be met in 12 visits)   Pt will demonstrate improved DF AROM to >= 10 degrees to promote proper foot clearance during gait and greater ease descending stairs without compensation  Pt will have increased ankle strength to 5/5 throughout for improved ankle control with ADLs such as prolonged gait and stair negotiation  Pt will have improved SLS to >30s for increased ankle stability with ambulation on uneven surfaces such as gravel and grass   Pt will report <0/10 pain with work and home activities such as lifting 50 lbs    Pt will be independent and compliant with comprehensive HEP to maintain progress achieved in PT               Plan  Continue to progress with strengthening and balance activities as tolerated.    Treatment Last 4 Visits  Treatment Day: 10       3/31/2025 4/9/2025 4/11/2025 4/15/2025   LE Treatment   Therapeutic Exercise Recumbent bike 5'   PROM L ankle and toes   4 way ankle t-band RTB x20 ea   Towel wipers ER/IR 2 x 10   Seated calf raise 2 x 10 5#   Seated  DF ROM 2 x 10   Ankle circles 2 x10      Recumbent bike 5'   PROM L ankle and toes   4 way ankle t-band RTB x20 ea   Ankle circles 2 x10   Rockerboard taps AP/ML 1' ea  Side stepping RTB loop on ankles, 2 laps  Shuttle leg press 100# 2x15  Step ups 6\" step 1x10     Recumbent bike 5'   PROM L ankle and toes   Standing calf stretch on slant board 2x30\"  Rockerboard taps AP/ML 1' ea  Side stepping RTB loop on ankles, 2 laps  Shuttle leg press 112# 2x15  Shuttle SL leg press 56# x20 B  Step ups with march 6\" step 2x10 B  Standing calf raises 2x10   Upright bike 5'   PROM L ankle and toes  Standing calf stretch on slant board 2x30\"   Lateral tap down 4inch 2 x 10 *cues for proper form  Shuttle leg press 112# 2x15   Shuttle SL leg press 56# x20 B   Standing calf B raises 2x10      Neuro Re-Education  SLS balance 2x30\" Rockerboard balance, AP/ML 60\" ea  SLS balance on Airex 3x20\" b Rockerboard balance, AP/ML 60\" ea   SLS balance on Airex 3x20\" b   Bosu lunge x 10 (B)  Bosu step ups x 10 (B)  Lateral bosu step ups x 10 (B)     Manual Therapy STM to posterior ankle complex   L talocrural joint mobilization grade II/III   L midfoot mobilization grade III    Jt mobs L TC joint Post glide Gr 3  Jt mobs L TC joint Post glide Gr 3   Modalities Game ready x 10 minutes medium compression      Therapeutic Exercise Minutes 15 45 41 38   Neuro Re-Educ Minutes   4    Manual Therapy Minutes 25 3  5   Hot/Cold Pack Minutes 10      Total Time Of Timed Procedures 40 48 45 43   Total Time Of Service-Based Procedures 10 0 0 0   Total Treatment Time 50 48 45 43        HEP  Education provided on gradual progression back to walking. Suggested to start with 5-10 minutes and progress by 5 minutes each time if no symptoms occurs.     Charges     3 TE

## 2025-04-22 ENCOUNTER — OFFICE VISIT (OUTPATIENT)
Dept: PHYSICAL THERAPY | Age: 35
End: 2025-04-22
Attending: PODIATRIST
Payer: COMMERCIAL

## 2025-04-22 ENCOUNTER — TELEPHONE (OUTPATIENT)
Facility: CLINIC | Age: 35
End: 2025-04-22

## 2025-04-22 DIAGNOSIS — M25.572 LEFT ANKLE PAIN, UNSPECIFIED CHRONICITY: Primary | ICD-10-CM

## 2025-04-22 PROCEDURE — 97110 THERAPEUTIC EXERCISES: CPT

## 2025-04-22 NOTE — PROGRESS NOTES
Discharge Summary  Pt has attended 11 visits in Physical Therapy.       Patient: Ashlee Clement (35 year old, female) Referring Provider: Tiffanie Meza  Insurance:   Diagnosis: Closed fracture of distal end of left fibula with routine healing, unspecified fracture morphology, subsequent encounter (S82.832D) No ref. provider found  [employer]   Date of Surgery: N/A Next MD visit:  N/A   Precautions:  None beginning of April Referral Information:    Date of Evaluation: Req: 0, Auth: 0, Exp:     03/12/25 POC Auth Visits:  12       Today's Date   4/22/2025    Subjective  Patient states that overall she is doing well. Patient states that she has difficulty with walking at a faster speed. Patient reports that she feels she is 80% improved since beginning PT. Patient would like today to be her last day.       Pain: 0/10     Objective       Musculoskeletal  Observation: Gait WNL with mildly reduced speed  Palpation: TTP to L distal fibula            Accessory Motion: WNL              Edema: Figure 8 R 50.5 cm; L 51 cm           ROM and Strength: (* denotes performed with pain)         Knee    ROM MMT (-/5)    R L R L     Flex   4+/5 4+/5     Ext (L3)   5/5 5/5             Ankle/Foot    ROM MMT (-/5)    R L R L     PF 67 60 5/5 5/5     DF (L4) 10 10 5/5 5/5     Inversion 32 31 5/5 5/5     Eversion 20 20 4+/5 4+/5     Grt Toe Ext (L5) 50 45 5/5 5/5      Flexibility:  LE Flexibility R L     Gastroc-soleus WNL WNL      Balance and Functional Mobility:  Gait:          Balance: SLS: R 28 seconds,  L 30 seconds   Floor to waist transfer: 20# x 5 reps with good form      Assessment  Patient has attended 11 visits of PT sessions. Patient has made significant improvements in ROM, strength and function since onset of PT session. Patient has met all set goals and wishes to discharge at this date. Patient safe to discharge to [facility] at this time.    Goals (to be met in 12 visits)   Pt will demonstrate  improved DF AROM to >= 10 degrees to promote proper foot clearance during gait and greater ease descending stairs without compensation- MET  Pt will have increased ankle strength to 5/5 throughout for improved ankle control with ADLs such as prolonged gait and stair negotiation- MET  Pt will have improved SLS to >30s for increased ankle stability with ambulation on uneven surfaces such as gravel and grass - MET  Pt will report <0/10 pain with work and home activities such as lifting 50 lbs- MET  Pt will be independent and compliant with comprehensive HEP to maintain progress achieved in PT- MET         Plan: Patient will DC from PT and continue independently with her HEP.     Patient/Family/Caregiver was advised of these findings, precautions, and treatment options and has agreed to actively participate in planning and for this course of care.    Thank you for your referral. If you have any questions, please contact me at Dept: [phone].    Sincerely,  Electronically signed by therapist: Hardik Singleton, PT     Physician's certification required:  No  Please co-sign or sign and return this letter via fax as soon as possible to [phone].   I certify the need for these services furnished under this plan of treatment and while under my care.    X___________________________________________________ Date____________________    Certification From: 4/22/2025  To:7/21/2025      Plan  Discharge to HEP    Treatment Last 4 Visits  Treatment Day: 11       4/9/2025 4/11/2025 4/15/2025 4/22/2025   LE Treatment   Therapeutic Exercise Recumbent bike 5'   PROM L ankle and toes   4 way ankle t-band RTB x20 ea   Ankle circles 2 x10   Rockerboard taps AP/ML 1' ea  Side stepping RTB loop on ankles, 2 laps  Shuttle leg press 100# 2x15  Step ups 6\" step 1x10     Recumbent bike 5'   PROM L ankle and toes   Standing calf stretch on slant board 2x30\"  Rockerboard taps AP/ML 1' ea  Side stepping RTB loop on ankles, 2 laps  Shuttle  leg press 112# 2x15  Shuttle SL leg press 56# x20 B  Step ups with march 6\" step 2x10 B  Standing calf raises 2x10   Upright bike 5'   PROM L ankle and toes  Standing calf stretch on slant board 2x30\"   Lateral tap down 4inch 2 x 10 *cues for proper form  Shuttle leg press 112# 2x15   Shuttle SL leg press 56# x20 B   Standing calf B raises 2x10    DC assessment   HEP review    Neuro Re-Education SLS balance 2x30\" Rockerboard balance, AP/ML 60\" ea  SLS balance on Airex 3x20\" b Rockerboard balance, AP/ML 60\" ea   SLS balance on Airex 3x20\" b   Bosu lunge x 10 (B)  Bosu step ups x 10 (B)  Lateral bosu step ups x 10 (B)      Manual Therapy Jt mobs L TC joint Post glide Gr 3  Jt mobs L TC joint Post glide Gr 3    Therapeutic Exercise Minutes 45 41 38 43   Neuro Re-Educ Minutes  4     Manual Therapy Minutes 3  5    Total Time Of Timed Procedures 48 45 43 43   Total Time Of Service-Based Procedures 0 0 0 0   Total Treatment Time 48 45 43 43   HEP    Access Code: [email or web address]  URL: [email or web address]  Date: 04/22/2025  Prepared by: Sarah Barrientos    Exercises  - Gastroc Stretch on Wall  - 1 x daily - 7 x weekly - 3 reps - 30 hold  - Toe Yoga - Alternating Great Toe and Lesser Toe Extension  - 1 x daily - 7 x weekly - 2 sets - 10 reps  - Heel Raises with Counter Support  - 1 x daily - 7 x weekly - 3 sets - 10 reps  - Heel Toe Raises with Counter Support  - 1 x daily - 7 x weekly - 3 sets - 10 reps  - Lateral Step Down  - 1 x daily - 7 x weekly - 3 sets - 10 reps  - Step Up  - 1 x daily - 7 x weekly - 3 sets - 10 reps  - Side Stepping with Resistance at Feet  - 1 x daily - 7 x weekly - 3 sets - 10 reps  - Forward Band Walks with Resistance at Thighs and Feet  - 1 x daily - 7 x weekly - 3 sets - 10 reps  - Long Sitting Ankle Eversion with Resistance  - 1 x daily - 7 x weekly - 3 sets - 10 reps  - Long Sitting Ankle Plantar Flexion with Resistance  - 1 x daily - 7 x weekly - 3 sets - 10 reps  - Long Sitting  Ankle Inversion with Resistance  - 1 x daily - 7 x weekly - 3 sets - 10 reps  - Long Sitting Ankle Dorsiflexion with Anchored Resistance  - 1 x daily - 7 x weekly - 3 sets - 10 reps  - Standing Repeated Hip Abduction on Foam Pad  - 1 x daily - 7 x weekly - 3 sets - 10 reps  - Standing Repeated Hip Flexion on Foam Pad  - 1 x daily - 7 x weekly - 3 sets - 10 reps  - Standing Repeated Hip Flexion on Foam Pad  - 1 x daily - 7 x weekly - 3 sets - 10 reps        HEP  Access Code: [email or web address]  URL: [email or web address]  Date: 04/22/2025  Prepared by: Sarah Barrientos    Exercises  - Gastroc Stretch on Wall  - 1 x daily - 7 x weekly - 3 reps - 30 hold  - Toe Yoga - Alternating Great Toe and Lesser Toe Extension  - 1 x daily - 7 x weekly - 2 sets - 10 reps  - Heel Raises with Counter Support  - 1 x daily - 7 x weekly - 3 sets - 10 reps  - Heel Toe Raises with Counter Support  - 1 x daily - 7 x weekly - 3 sets - 10 reps  - Lateral Step Down  - 1 x daily - 7 x weekly - 3 sets - 10 reps  - Step Up  - 1 x daily - 7 x weekly - 3 sets - 10 reps  - Side Stepping with Resistance at Feet  - 1 x daily - 7 x weekly - 3 sets - 10 reps  - Forward Band Walks with Resistance at Thighs and Feet  - 1 x daily - 7 x weekly - 3 sets - 10 reps  - Long Sitting Ankle Eversion with Resistance  - 1 x daily - 7 x weekly - 3 sets - 10 reps  - Long Sitting Ankle Plantar Flexion with Resistance  - 1 x daily - 7 x weekly - 3 sets - 10 reps  - Long Sitting Ankle Inversion with Resistance  - 1 x daily - 7 x weekly - 3 sets - 10 reps  - Long Sitting Ankle Dorsiflexion with Anchored Resistance  - 1 x daily - 7 x weekly - 3 sets - 10 reps  - Standing Repeated Hip Abduction on Foam Pad  - 1 x daily - 7 x weekly - 3 sets - 10 reps  - Standing Repeated Hip Flexion on Foam Pad  - 1 x daily - 7 x weekly - 3 sets - 10 reps  - Standing Repeated Hip Flexion on Foam Pad  - 1 x daily - 7 x weekly - 3 sets - 10 reps    Charges     3  TE

## 2025-04-22 NOTE — PROGRESS NOTES
Patient: Ashlee Clement (35 year old, female) Referring Provider:  Insurance:   Diagnosis: Closed fracture of distal end of left fibula with routine healing, unspecified fracture morphology, subsequent encounter (R66.182H) No ref. provider found  [employer]   Date of Surgery: N/A Next MD visit:  N/A   Precautions:  None beginning of April Referral Information:    Date of Evaluation: Req: 0, Auth: 0, Exp:     03/12/25 POC Auth Visits:  12       Today's Date   4/22/2025    Subjective  Patient states that overall she is doing well. Patient states that she has difficulty with walking at a faster speed. Patient reports that she feels she is 80% improved since beginning PT. Patient would like today to be her last day.       Pain: 0/10     Objective       Musculoskeletal  Observation: Gait WNL with mildly reduced speed  Palpation: TTP to L distal fibula            Accessory Motion: WNL              Edema: Figure 8 R 50.5 cm; L 51 cm           ROM and Strength: (* denotes performed with pain)         Knee    ROM MMT (-/5)    R L R L     Flex   4+/5 4+/5     Ext (L3)   5/5 5/5             Ankle/Foot    ROM MMT (-/5)    R L R L     PF 67 60 5/5 5/5     DF (L4) 10 10 5/5 5/5     Inversion 32 31 5/5 5/5     Eversion 20 20 4+/5 4+/5     Grt Toe Ext (L5) 50 45 5/5 5/5      Flexibility:  LE Flexibility R L     Gastroc-soleus WNL WNL      Balance and Functional Mobility:  Gait:          Balance: SLS: R 28 seconds,  L 30 seconds   Floor to waist transfer: 20# x 5 reps with good form      Assessment  Patient has attended 11 visits of PT sessions. Patient has made significant improvements in ROM, strength and function since onset of PT session. Patient has met all set goals and wishes to discharge at this date. Patient safe to discharge to [facility] at this time.    Goals (to be met in 12 visits)   Pt will demonstrate improved DF AROM to >= 10 degrees to promote proper foot clearance during gait and greater ease  descending stairs without compensation- MET  Pt will have increased ankle strength to 5/5 throughout for improved ankle control with ADLs such as prolonged gait and stair negotiation- MET  Pt will have improved SLS to >30s for increased ankle stability with ambulation on uneven surfaces such as gravel and grass - MET  Pt will report <0/10 pain with work and home activities such as lifting 50 lbs- MET  Pt will be independent and compliant with comprehensive HEP to maintain progress achieved in PT- MET       Plan  Discharge to HEP    Treatment Last 4 Visits  Treatment Day: 11       4/9/2025 4/11/2025 4/15/2025 4/22/2025   LE Treatment   Therapeutic Exercise Recumbent bike 5'   PROM L ankle and toes   4 way ankle t-band RTB x20 ea   Ankle circles 2 x10   Rockerboard taps AP/ML 1' ea  Side stepping RTB loop on ankles, 2 laps  Shuttle leg press 100# 2x15  Step ups 6\" step 1x10     Recumbent bike 5'   PROM L ankle and toes   Standing calf stretch on slant board 2x30\"  Rockerboard taps AP/ML 1' ea  Side stepping RTB loop on ankles, 2 laps  Shuttle leg press 112# 2x15  Shuttle SL leg press 56# x20 B  Step ups with march 6\" step 2x10 B  Standing calf raises 2x10   Upright bike 5'   PROM L ankle and toes  Standing calf stretch on slant board 2x30\"   Lateral tap down 4inch 2 x 10 *cues for proper form  Shuttle leg press 112# 2x15   Shuttle SL leg press 56# x20 B   Standing calf B raises 2x10    DC assessment   HEP review    Neuro Re-Education SLS balance 2x30\" Rockerboard balance, AP/ML 60\" ea  SLS balance on Airex 3x20\" b Rockerboard balance, AP/ML 60\" ea   SLS balance on Airex 3x20\" b   Bosu lunge x 10 (B)  Bosu step ups x 10 (B)  Lateral bosu step ups x 10 (B)      Manual Therapy Jt mobs L TC joint Post glide Gr 3  Jt mobs L TC joint Post glide Gr 3    Therapeutic Exercise Minutes 45 41 38 43   Neuro Re-Educ Minutes  4     Manual Therapy Minutes 3  5    Total Time Of Timed Procedures 48 45 43 43   Total Time Of  Service-Based Procedures 0 0 0 0   Total Treatment Time 48 45 43 43   HEP    Access Code: [email or web address]  URL: [email or web address]/  Date: 04/22/2025  Prepared by: Sarah Barrientos    Exercises  - Gastroc Stretch on Wall  - 1 x daily - 7 x weekly - 3 reps - 30 hold  - Toe Yoga - Alternating Great Toe and Lesser Toe Extension  - 1 x daily - 7 x weekly - 2 sets - 10 reps  - Heel Raises with Counter Support  - 1 x daily - 7 x weekly - 3 sets - 10 reps  - Heel Toe Raises with Counter Support  - 1 x daily - 7 x weekly - 3 sets - 10 reps  - Lateral Step Down  - 1 x daily - 7 x weekly - 3 sets - 10 reps  - Step Up  - 1 x daily - 7 x weekly - 3 sets - 10 reps  - Side Stepping with Resistance at Feet  - 1 x daily - 7 x weekly - 3 sets - 10 reps  - Forward Band Walks with Resistance at Thighs and Feet  - 1 x daily - 7 x weekly - 3 sets - 10 reps  - Long Sitting Ankle Eversion with Resistance  - 1 x daily - 7 x weekly - 3 sets - 10 reps  - Long Sitting Ankle Plantar Flexion with Resistance  - 1 x daily - 7 x weekly - 3 sets - 10 reps  - Long Sitting Ankle Inversion with Resistance  - 1 x daily - 7 x weekly - 3 sets - 10 reps  - Long Sitting Ankle Dorsiflexion with Anchored Resistance  - 1 x daily - 7 x weekly - 3 sets - 10 reps  - Standing Repeated Hip Abduction on Foam Pad  - 1 x daily - 7 x weekly - 3 sets - 10 reps  - Standing Repeated Hip Flexion on Foam Pad  - 1 x daily - 7 x weekly - 3 sets - 10 reps  - Standing Repeated Hip Flexion on Foam Pad  - 1 x daily - 7 x weekly - 3 sets - 10 reps        HEP  Access Code: [email or web address]  URL: [email or web address]/  Date: 04/22/2025  Prepared by: Sarah Barrientos    Exercises  - Gastroc Stretch on Wall  - 1 x daily - 7 x weekly - 3 reps - 30 hold  - Toe Yoga - Alternating Great Toe and Lesser Toe Extension  - 1 x daily - 7 x weekly - 2 sets - 10 reps  - Heel Raises with Counter Support  - 1 x daily - 7 x weekly - 3 sets - 10 reps  - Heel Toe Raises with  Counter Support  - 1 x daily - 7 x weekly - 3 sets - 10 reps  - Lateral Step Down  - 1 x daily - 7 x weekly - 3 sets - 10 reps  - Step Up  - 1 x daily - 7 x weekly - 3 sets - 10 reps  - Side Stepping with Resistance at Feet  - 1 x daily - 7 x weekly - 3 sets - 10 reps  - Forward Band Walks with Resistance at Thighs and Feet  - 1 x daily - 7 x weekly - 3 sets - 10 reps  - Long Sitting Ankle Eversion with Resistance  - 1 x daily - 7 x weekly - 3 sets - 10 reps  - Long Sitting Ankle Plantar Flexion with Resistance  - 1 x daily - 7 x weekly - 3 sets - 10 reps  - Long Sitting Ankle Inversion with Resistance  - 1 x daily - 7 x weekly - 3 sets - 10 reps  - Long Sitting Ankle Dorsiflexion with Anchored Resistance  - 1 x daily - 7 x weekly - 3 sets - 10 reps  - Standing Repeated Hip Abduction on Foam Pad  - 1 x daily - 7 x weekly - 3 sets - 10 reps  - Standing Repeated Hip Flexion on Foam Pad  - 1 x daily - 7 x weekly - 3 sets - 10 reps  - Standing Repeated Hip Flexion on Foam Pad  - 1 x daily - 7 x weekly - 3 sets - 10 reps    Charges     3 TE

## 2025-04-23 ENCOUNTER — HOSPITAL ENCOUNTER (OUTPATIENT)
Dept: GENERAL RADIOLOGY | Age: 35
Discharge: HOME | End: 2025-04-23
Attending: PODIATRIST
Payer: COMMERCIAL

## 2025-04-23 ENCOUNTER — OFFICE VISIT (OUTPATIENT)
Dept: ORTHOPEDICS CLINIC | Facility: CLINIC | Age: 35
End: 2025-04-23
Payer: COMMERCIAL

## 2025-04-23 VITALS — HEIGHT: 64 IN | WEIGHT: 205 LBS | BODY MASS INDEX: 35 KG/M2

## 2025-04-23 DIAGNOSIS — M25.572 LEFT ANKLE PAIN, UNSPECIFIED CHRONICITY: ICD-10-CM

## 2025-04-23 DIAGNOSIS — S82.832D CLOSED FRACTURE OF DISTAL END OF LEFT FIBULA WITH ROUTINE HEALING, UNSPECIFIED FRACTURE MORPHOLOGY, SUBSEQUENT ENCOUNTER: Primary | ICD-10-CM

## 2025-04-23 PROCEDURE — 99213 OFFICE O/P EST LOW 20 MIN: CPT | Performed by: PODIATRIST

## 2025-04-23 PROCEDURE — 73610 X-RAY EXAM OF ANKLE: CPT | Performed by: PODIATRIST

## 2025-04-23 RX ORDER — LURASIDONE HYDROCHLORIDE 20 MG/1
1 TABLET, FILM COATED ORAL
COMMUNITY

## 2025-04-23 RX ORDER — SERTRALINE HYDROCHLORIDE 100 MG/1
TABLET, FILM COATED ORAL
COMMUNITY

## 2025-04-23 NOTE — PROGRESS NOTES
[facility] Progress Note    Subjective:     Patient is here for follow-up of the left distal fibular fracture of the left ankle now almost 3 months post injury.  She is doing very well and did finish physical therapy.  She has occasional discomfort but otherwise is would like to increase her activity        Objective:     Exam no palpable tenderness about the distal fibula.  No swelling.  Ankle is stable with full range of motion          Imaging: X-rays show the fracture to still be healing and it is evident but is starting to fail him nicely.  Fracture is stable        Assessment/Plan:     Diagnoses and all orders for this visit:    Closed fracture of distal end of left fibula with routine healing, unspecified fracture morphology, subsequent encounter        Encouraged her that she is healing but still we are seeing signs of continued healing needed.  I would like another x-ray in about 6 weeks of the ankle.  Still being cautious use of a brace as needed and increase her walking slowly.  No running jumping heavy lifting etc. at least until I can clear her.  Follow-up in 6 weeks      Tiffanie Meza, [facility]  [facility]  [facility]  [address] [place] [address], [address] [id number] [address] [place], [place] [id number] [email or web address]  [email or web address]  t: [phone]   f: [phone]            Dragon speech recognition software was used to prepare this note. If a word or phrase is confusing, it is likely do to a failure of recognition. Please contact me with any questions or clarifications.

## 2025-04-28 ENCOUNTER — APPOINTMENT (OUTPATIENT)
Dept: PHYSICAL THERAPY | Age: 35
End: 2025-04-28
Attending: PODIATRIST
Payer: COMMERCIAL

## 2025-05-05 ENCOUNTER — APPOINTMENT (OUTPATIENT)
Dept: PHYSICAL THERAPY | Age: 35
End: 2025-05-05
Attending: PODIATRIST
Payer: COMMERCIAL

## 2025-06-03 ENCOUNTER — TELEPHONE (OUTPATIENT)
Dept: ORTHOPEDICS CLINIC | Facility: CLINIC | Age: 35
End: 2025-06-03

## 2025-06-03 DIAGNOSIS — M25.572 LEFT ANKLE PAIN, UNSPECIFIED CHRONICITY: Primary | ICD-10-CM

## 2025-06-03 NOTE — TELEPHONE ENCOUNTER
XR ordered and scheduled per Ortho protocol.   Sent patient message via [employer] to inform them and ask them to arrive 15-20 minutes prior to appointment with

## 2025-06-04 ENCOUNTER — HOSPITAL ENCOUNTER (OUTPATIENT)
Dept: GENERAL RADIOLOGY | Age: 35
Discharge: HOME | End: 2025-06-04
Attending: PODIATRIST
Payer: COMMERCIAL

## 2025-06-04 ENCOUNTER — OFFICE VISIT (OUTPATIENT)
Dept: ORTHOPEDICS CLINIC | Facility: CLINIC | Age: 35
End: 2025-06-04
Payer: COMMERCIAL

## 2025-06-04 DIAGNOSIS — M25.572 LEFT ANKLE PAIN, UNSPECIFIED CHRONICITY: ICD-10-CM

## 2025-06-04 DIAGNOSIS — S82.832D CLOSED FRACTURE OF DISTAL END OF LEFT FIBULA WITH ROUTINE HEALING, UNSPECIFIED FRACTURE MORPHOLOGY, SUBSEQUENT ENCOUNTER: Primary | ICD-10-CM

## 2025-06-04 DIAGNOSIS — S86.012A STRAIN OF LEFT ACHILLES TENDON, INITIAL ENCOUNTER: ICD-10-CM

## 2025-06-04 PROCEDURE — 73610 X-RAY EXAM OF ANKLE: CPT | Performed by: PODIATRIST

## 2025-06-04 PROCEDURE — 99213 OFFICE O/P EST LOW 20 MIN: CPT | Performed by: PODIATRIST

## 2025-06-04 NOTE — PROGRESS NOTES
[facility] Progress Note    Subjective:     Pt here for follow up  About 5 months post ankle fracture, injury  Having some achilles discomfort  Completed PT        Objective:   Exam left LE  Exam no tenderness about lateral ankle  Ankle stable no swelling  Tender at achilles posteriorly.  No thickening or crepitus          Imaging: fracture healing and stable lateral malleolus left ankle views.        Assessment/Plan:     Diagnoses and all orders for this visit:    Closed fracture of distal end of left fibula with routine healing, unspecified fracture morphology, subsequent encounter    Strain of left Achilles tendon, initial encounter    No follow up needed for xray  Continue good shoes  No impact activity for 1 more month    May need to resume PT if achilles issue does not resolve        Tiffanie Meza, DPMPodiatric Surgery  [facility]  [facility]  [address], [address], [place] [address] [address] [place] [address] [email or web address]  [email or web address]  t: [phone]   f: [phone]            Dragon speech recognition software was used to prepare this note. If a word or phrase is confusing, it is likely do to a failure of recognition. Please contact me with any questions or clarifications.

## (undated) NOTE — LETTER
Date: 1/15/2025    Patient Name: Ashlee Clement          To Whom it may concern:    This patient should be excused from attending work from 01/15/2025 at least through 03/12/2025 at which time she will be reassessed for an orthopaedic condition.      Sincerely,    Tiffanie Meza DPM

## (undated) NOTE — LETTER
Date: 2/27/2025    Patient Name: Ashlee Clement          To Whom it may concern:    This letter has been written at the patient's request. The above patient was seen at [facility] for treatment of a medical condition.    This patient should be excused from attending work from  2.27.25 until her next evaluation in 6 weeks  at  that time she will be reassessed for an orthopaedic condition.         Sincerely,    Tiffanie Meza DPM